# Patient Record
Sex: FEMALE | Race: WHITE | Employment: OTHER | URBAN - METROPOLITAN AREA
[De-identification: names, ages, dates, MRNs, and addresses within clinical notes are randomized per-mention and may not be internally consistent; named-entity substitution may affect disease eponyms.]

---

## 2017-01-09 ENCOUNTER — APPOINTMENT (OUTPATIENT)
Dept: GENERAL RADIOLOGY | Facility: HOSPITAL | Age: 82
DRG: 190 | End: 2017-01-09
Attending: EMERGENCY MEDICINE
Payer: MEDICARE

## 2017-01-09 ENCOUNTER — HOSPITAL ENCOUNTER (INPATIENT)
Facility: HOSPITAL | Age: 82
LOS: 3 days | Discharge: HOME | DRG: 190 | End: 2017-01-13
Attending: EMERGENCY MEDICINE | Admitting: HOSPITALIST
Payer: MEDICARE

## 2017-01-09 DIAGNOSIS — J18.9 COMMUNITY ACQUIRED PNEUMONIA: Primary | ICD-10-CM

## 2017-01-09 PROCEDURE — 71020 XR CHEST PA + LAT CHEST (CPT=71020): CPT

## 2017-01-09 PROCEDURE — 93005 ELECTROCARDIOGRAM TRACING: CPT

## 2017-01-09 PROCEDURE — 99285 EMERGENCY DEPT VISIT HI MDM: CPT

## 2017-01-09 PROCEDURE — 96374 THER/PROPH/DIAG INJ IV PUSH: CPT

## 2017-01-09 PROCEDURE — 94640 AIRWAY INHALATION TREATMENT: CPT

## 2017-01-09 PROCEDURE — 36415 COLL VENOUS BLD VENIPUNCTURE: CPT

## 2017-01-09 PROCEDURE — 93010 ELECTROCARDIOGRAM REPORT: CPT | Performed by: EMERGENCY MEDICINE

## 2017-01-09 RX ORDER — IPRATROPIUM BROMIDE AND ALBUTEROL SULFATE 2.5; .5 MG/3ML; MG/3ML
3 SOLUTION RESPIRATORY (INHALATION) ONCE
Status: COMPLETED | OUTPATIENT
Start: 2017-01-09 | End: 2017-01-09

## 2017-01-09 RX ORDER — IPRATROPIUM BROMIDE AND ALBUTEROL SULFATE 2.5; .5 MG/3ML; MG/3ML
3 SOLUTION RESPIRATORY (INHALATION) EVERY 6 HOURS PRN
Status: DISCONTINUED | OUTPATIENT
Start: 2017-01-09 | End: 2017-01-11

## 2017-01-10 LAB
ADENOVIRUS PCR:: NEGATIVE
ANION GAP SERPL CALC-SCNC: 12 MMOL/L (ref 0–18)
ANION GAP SERPL CALC-SCNC: 7 MMOL/L (ref 0–18)
B PERT DNA SPEC QL NAA+PROBE: NEGATIVE
BASOPHILS # BLD: 0 K/UL (ref 0–0.2)
BASOPHILS # BLD: 0 K/UL (ref 0–0.2)
BASOPHILS NFR BLD: 0 %
BASOPHILS NFR BLD: 0 %
BUN SERPL-MCNC: 17 MG/DL (ref 8–20)
BUN SERPL-MCNC: 19 MG/DL (ref 8–20)
BUN/CREAT SERPL: 11.2 (ref 10–20)
BUN/CREAT SERPL: 11.7 (ref 10–20)
C PNEUM DNA SPEC QL NAA+PROBE: NEGATIVE
CALCIUM SERPL-MCNC: 8.4 MG/DL (ref 8.5–10.5)
CALCIUM SERPL-MCNC: 8.6 MG/DL (ref 8.5–10.5)
CHLORIDE SERPL-SCNC: 101 MMOL/L (ref 95–110)
CHLORIDE SERPL-SCNC: 105 MMOL/L (ref 95–110)
CO2 SERPL-SCNC: 25 MMOL/L (ref 22–32)
CO2 SERPL-SCNC: 26 MMOL/L (ref 22–32)
CORONAVIRUS 229E PCR:: NEGATIVE
CORONAVIRUS HKU1 PCR:: NEGATIVE
CORONAVIRUS NL63 PCR:: NEGATIVE
CORONAVIRUS OC43 PCR:: NEGATIVE
CREAT SERPL-MCNC: 1.45 MG/DL (ref 0.5–1.5)
CREAT SERPL-MCNC: 1.7 MG/DL (ref 0.5–1.5)
EOSINOPHIL # BLD: 0.1 K/UL (ref 0–0.7)
EOSINOPHIL # BLD: 0.2 K/UL (ref 0–0.7)
EOSINOPHIL NFR BLD: 1 %
EOSINOPHIL NFR BLD: 1 %
ERYTHROCYTE [DISTWIDTH] IN BLOOD BY AUTOMATED COUNT: 15 % (ref 11–15)
ERYTHROCYTE [DISTWIDTH] IN BLOOD BY AUTOMATED COUNT: 15 % (ref 11–15)
FLUAV H1 2009 PAND RNA SPEC QL NAA+PROBE: NEGATIVE
FLUAV H1 RNA SPEC QL NAA+PROBE: NEGATIVE
FLUAV H3 RNA SPEC QL NAA+PROBE: NEGATIVE
FLUAV RNA SPEC QL NAA+PROBE: NEGATIVE
FLUBV RNA SPEC QL NAA+PROBE: NEGATIVE
GLUCOSE BLDC GLUCOMTR-MCNC: 175 MG/DL (ref 70–99)
GLUCOSE BLDC GLUCOMTR-MCNC: 181 MG/DL (ref 70–99)
GLUCOSE BLDC GLUCOMTR-MCNC: 182 MG/DL (ref 70–99)
GLUCOSE BLDC GLUCOMTR-MCNC: 272 MG/DL (ref 70–99)
GLUCOSE BLDC GLUCOMTR-MCNC: 401 MG/DL (ref 70–99)
GLUCOSE SERPL-MCNC: 180 MG/DL (ref 70–99)
GLUCOSE SERPL-MCNC: 183 MG/DL (ref 70–99)
HBA1C MFR BLD: 8.2 % (ref 4–6)
HCT VFR BLD AUTO: 25.6 % (ref 35–48)
HCT VFR BLD AUTO: 28.5 % (ref 35–48)
HGB BLD-MCNC: 8.2 G/DL (ref 12–16)
HGB BLD-MCNC: 9.2 G/DL (ref 12–16)
LYMPHOCYTES # BLD: 10.2 K/UL (ref 1–4)
LYMPHOCYTES # BLD: 9.1 K/UL (ref 1–4)
LYMPHOCYTES NFR BLD: 60 %
LYMPHOCYTES NFR BLD: 63 %
MCH RBC QN AUTO: 27.9 PG (ref 27–32)
MCH RBC QN AUTO: 28 PG (ref 27–32)
MCHC RBC AUTO-ENTMCNC: 32 G/DL (ref 32–37)
MCHC RBC AUTO-ENTMCNC: 32.4 G/DL (ref 32–37)
MCV RBC AUTO: 86.6 FL (ref 80–100)
MCV RBC AUTO: 87.2 FL (ref 80–100)
METAPNEUMOVIRUS PCR:: NEGATIVE
MONOCYTES # BLD: 0.6 K/UL (ref 0–1)
MONOCYTES # BLD: 0.7 K/UL (ref 0–1)
MONOCYTES NFR BLD: 4 %
MONOCYTES NFR BLD: 4 %
MYCOPLASMA PNEUMONIA PCR:: NEGATIVE
NEUTROPHILS # BLD AUTO: 5.3 K/UL (ref 1.8–7.7)
NEUTROPHILS # BLD AUTO: 5.4 K/UL (ref 1.8–7.7)
NEUTROPHILS NFR BLD: 32 %
NEUTROPHILS NFR BLD: 35 %
OSMOLALITY UR CALC.SUM OF ELEC: 292 MOSM/KG (ref 275–295)
OSMOLALITY UR CALC.SUM OF ELEC: 293 MOSM/KG (ref 275–295)
PARAINFLUENZA 1 PCR:: NEGATIVE
PARAINFLUENZA 2 PCR:: NEGATIVE
PARAINFLUENZA 3 PCR:: NEGATIVE
PARAINFLUENZA 4 PCR:: NEGATIVE
PLATELET # BLD AUTO: 147 K/UL (ref 140–400)
PLATELET # BLD AUTO: 159 K/UL (ref 140–400)
PMV BLD AUTO: 8.5 FL (ref 7.4–10.3)
PMV BLD AUTO: 9.2 FL (ref 7.4–10.3)
POTASSIUM SERPL-SCNC: 3.9 MMOL/L (ref 3.3–5.1)
POTASSIUM SERPL-SCNC: 4.4 MMOL/L (ref 3.3–5.1)
PROCALCITONIN SERPL-MCNC: 0.13 NG/ML (ref ?–0.11)
RBC # BLD AUTO: 2.94 M/UL (ref 3.7–5.4)
RBC # BLD AUTO: 3.29 M/UL (ref 3.7–5.4)
RHINOVIRUS/ENTERO PCR:: POSITIVE
RSV RNA SPEC QL NAA+PROBE: NEGATIVE
SODIUM SERPL-SCNC: 138 MMOL/L (ref 136–144)
SODIUM SERPL-SCNC: 138 MMOL/L (ref 136–144)
TROPONIN I SERPL-MCNC: 0.01 NG/ML (ref ?–0.03)
WBC # BLD AUTO: 15.3 K/UL (ref 4–11)
WBC # BLD AUTO: 16.4 K/UL (ref 4–11)

## 2017-01-10 PROCEDURE — 83036 HEMOGLOBIN GLYCOSYLATED A1C: CPT | Performed by: NURSE PRACTITIONER

## 2017-01-10 PROCEDURE — 85025 COMPLETE CBC W/AUTO DIFF WBC: CPT | Performed by: EMERGENCY MEDICINE

## 2017-01-10 PROCEDURE — 84145 PROCALCITONIN (PCT): CPT | Performed by: NURSE PRACTITIONER

## 2017-01-10 PROCEDURE — 87205 SMEAR GRAM STAIN: CPT | Performed by: INTERNAL MEDICINE

## 2017-01-10 PROCEDURE — 87040 BLOOD CULTURE FOR BACTERIA: CPT | Performed by: EMERGENCY MEDICINE

## 2017-01-10 PROCEDURE — 84484 ASSAY OF TROPONIN QUANT: CPT | Performed by: EMERGENCY MEDICINE

## 2017-01-10 PROCEDURE — 80048 BASIC METABOLIC PNL TOTAL CA: CPT | Performed by: EMERGENCY MEDICINE

## 2017-01-10 PROCEDURE — 87186 SC STD MICRODIL/AGAR DIL: CPT

## 2017-01-10 PROCEDURE — 87486 CHLMYD PNEUM DNA AMP PROBE: CPT | Performed by: HOSPITALIST

## 2017-01-10 PROCEDURE — 87633 RESP VIRUS 12-25 TARGETS: CPT | Performed by: HOSPITALIST

## 2017-01-10 PROCEDURE — 85025 COMPLETE CBC W/AUTO DIFF WBC: CPT | Performed by: INTERNAL MEDICINE

## 2017-01-10 PROCEDURE — 87158 CULTURE TYPING ADDED METHOD: CPT

## 2017-01-10 PROCEDURE — 87581 M.PNEUMON DNA AMP PROBE: CPT | Performed by: HOSPITALIST

## 2017-01-10 PROCEDURE — 82962 GLUCOSE BLOOD TEST: CPT

## 2017-01-10 PROCEDURE — 87206 SMEAR FLUORESCENT/ACID STAI: CPT | Performed by: INTERNAL MEDICINE

## 2017-01-10 PROCEDURE — 94640 AIRWAY INHALATION TREATMENT: CPT

## 2017-01-10 PROCEDURE — 87070 CULTURE OTHR SPECIMN AEROBIC: CPT | Performed by: INTERNAL MEDICINE

## 2017-01-10 PROCEDURE — 87798 DETECT AGENT NOS DNA AMP: CPT | Performed by: HOSPITALIST

## 2017-01-10 PROCEDURE — 80048 BASIC METABOLIC PNL TOTAL CA: CPT | Performed by: INTERNAL MEDICINE

## 2017-01-10 PROCEDURE — 87149 DNA/RNA DIRECT PROBE: CPT

## 2017-01-10 PROCEDURE — 87116 MYCOBACTERIA CULTURE: CPT | Performed by: INTERNAL MEDICINE

## 2017-01-10 RX ORDER — FLUTICASONE PROPIONATE 50 MCG
1 SPRAY, SUSPENSION (ML) NASAL 2 TIMES DAILY
Status: DISCONTINUED | OUTPATIENT
Start: 2017-01-10 | End: 2017-01-13

## 2017-01-10 RX ORDER — GUAIFENESIN 600 MG
600 TABLET, EXTENDED RELEASE 12 HR ORAL 2 TIMES DAILY
Status: DISCONTINUED | OUTPATIENT
Start: 2017-01-10 | End: 2017-01-13

## 2017-01-10 RX ORDER — ENOXAPARIN SODIUM 100 MG/ML
40 INJECTION SUBCUTANEOUS DAILY
Status: DISCONTINUED | OUTPATIENT
Start: 2017-01-10 | End: 2017-01-13

## 2017-01-10 RX ORDER — PANTOPRAZOLE SODIUM 40 MG/1
40 TABLET, DELAYED RELEASE ORAL DAILY
Status: DISCONTINUED | OUTPATIENT
Start: 2017-01-10 | End: 2017-01-13

## 2017-01-10 RX ORDER — DEXTROSE MONOHYDRATE 25 G/50ML
50 INJECTION, SOLUTION INTRAVENOUS AS NEEDED
Status: DISCONTINUED | OUTPATIENT
Start: 2017-01-10 | End: 2017-01-13

## 2017-01-10 RX ORDER — GUAIFENESIN 100 MG/5ML
200 SOLUTION ORAL EVERY 4 HOURS PRN
Status: DISCONTINUED | OUTPATIENT
Start: 2017-01-10 | End: 2017-01-13

## 2017-01-10 RX ORDER — AMLODIPINE BESYLATE 5 MG/1
5 TABLET ORAL DAILY
Status: DISCONTINUED | OUTPATIENT
Start: 2017-01-10 | End: 2017-01-13

## 2017-01-10 RX ORDER — PREDNISONE 20 MG/1
40 TABLET ORAL
Status: DISCONTINUED | OUTPATIENT
Start: 2017-01-10 | End: 2017-01-13

## 2017-01-10 RX ORDER — LEVOTHYROXINE SODIUM 0.05 MG/1
50 TABLET ORAL
Status: DISCONTINUED | OUTPATIENT
Start: 2017-01-10 | End: 2017-01-13

## 2017-01-10 RX ORDER — ESCITALOPRAM OXALATE 10 MG/1
20 TABLET ORAL
Status: DISCONTINUED | OUTPATIENT
Start: 2017-01-10 | End: 2017-01-13

## 2017-01-10 RX ORDER — ONDANSETRON 2 MG/ML
4 INJECTION INTRAMUSCULAR; INTRAVENOUS EVERY 6 HOURS PRN
Status: DISCONTINUED | OUTPATIENT
Start: 2017-01-10 | End: 2017-01-13

## 2017-01-10 RX ORDER — LOSARTAN POTASSIUM 100 MG/1
100 TABLET ORAL DAILY
Status: DISCONTINUED | OUTPATIENT
Start: 2017-01-10 | End: 2017-01-13

## 2017-01-10 RX ORDER — ACETAMINOPHEN 325 MG/1
650 TABLET ORAL EVERY 6 HOURS PRN
Status: DISCONTINUED | OUTPATIENT
Start: 2017-01-10 | End: 2017-01-13

## 2017-01-10 RX ORDER — ATORVASTATIN CALCIUM 10 MG/1
20 TABLET, FILM COATED ORAL NIGHTLY
Status: DISCONTINUED | OUTPATIENT
Start: 2017-01-11 | End: 2017-01-13

## 2017-01-10 RX ORDER — ALBUTEROL SULFATE 90 UG/1
2 AEROSOL, METERED RESPIRATORY (INHALATION) EVERY 4 HOURS PRN
Status: DISCONTINUED | OUTPATIENT
Start: 2017-01-10 | End: 2017-01-10

## 2017-01-10 RX ORDER — ALLOPURINOL 100 MG/1
100 TABLET ORAL DAILY
Status: DISCONTINUED | OUTPATIENT
Start: 2017-01-10 | End: 2017-01-13

## 2017-01-10 NOTE — PROGRESS NOTES
simvastatin (ZOCOR) tab 20 mg  is Non-Formulary Medication &  Auto-Substituted to Atorvastatin 10mg Per P&T PROTOCOL

## 2017-01-10 NOTE — H&P
[facility] Hospitalist Team  History and Physical     ASSESSMENT / PLAN:   81 yo female with CLL, Lung nodules, DM type II, HTN, depression, hypothyroidism, HL, GERD, renal caculi-on allopurinol,  morbid obesity- BMI 44, CKD stage III who presents with acute on c -BMI 44  -wt loss endeavors as outpt   -SUMIT eval     FEN  -lytes in am  -diet-ADA    Prophy  -SCD  -lovenox    Dispo  -pending clinical coarse  Spoke to daughter and  at [facility]. Pt is Ma and will need MA PANCHO appt.  Will discuss with pt   PCP: Joselyn Hammans PERRLA, EOMI, sclera anicteric, conjunctiva normal  NECK: supple; no JVD; no carotid bruits   RESPIRATORY: normal expansion; non labored; CTA   CARDIOVASCULAR: regular,nl S1 S2; no murmur, no gallop; no rub   ABDOMEN:  Soft, BS+; nondistended; non tender;n Disp: 90 tablet Rfl: 3   escitalopram 20 MG Oral Tab Take 1 tablet (20 mg total) by mouth once daily. Disp: 90 tablet Rfl: 3   Fluticasone Furoate-Vilanterol 200-25 MCG/INH Inhalation Aerosol Powder, Breath Activated Inhale 1 puff into the lungs daily.  Dis ALB, AMYLASE, LIPASE, LDH in the last 72 hours. Invalid input(s): ALPHOS, TBIL, DBIL, TPROT    Recent Labs   Lab  01/10/17   0008   TROP  0.01         Radiology: Xr Chest Pa + Lat Chest (cpt=71020)    1/10/2017  CONCLUSION:  1. Left basilar pneumonia.  2 PFT's once recovered  -wean O2  -pulmonary consult    Acute on Chronic SOB 2/2 PNA/Rhinovirus R/O Cardiac cause    -see above  -Troponin negative x1, EKG with ST, old anterior infarct and nonspecific ST-T wave changes  -echo ordered  -outpt ischemia eval a

## 2017-01-10 NOTE — CDS QUERY
Potential Risk for Infection  CLINICAL Lori Moeller    Dear Doctor:  Clinical information (provided below) suggests Potential Risk for Infection.  For accurate ICD-10-CM code assignment to reflect severity of illness and risk of mortalit

## 2017-01-10 NOTE — PROGRESS NOTES
Critical access hospital Pharmacy Note: Antimicrobial Weight Dose Adjustment for: Rocephin (ceftriaxone)    Cathy Lambert is a 80year old female who has been prescribed Rocephin (ceftriaxone) 1gm ivpb q24h.   CrCl is estimated creatinine clearance is 24.3 mL/min (based on Cr

## 2017-01-10 NOTE — CONSULTS
Pulmonary H&P/Consult       NAME: [address]: 515/890-O - MRN: [id number] - Age: 80year old - :  1935    Date of Admission: 2017 10:15 PM  Admission Diagnosis: Community acquired pneumonia [J18.9]    Assessment/Plan:  1.  Acute hypox and unspecified hyperlipidemia    • Unspecified essential hypertension    • Type II or unspecified type diabetes mellitus without mention of complication, not stated as uncontrolled ([facility]    • Hypothyroid    • Renal lithiasis    • Depression    • Esophagea blurred vision or double vision   HEENT: denies nasal congestion, sinus pain or ST , hard of hearing   RESPIRATORY: per HPI  CARDIOVASCULAR: denies current chest pain   GI: denies abdominal pain, diarrhea, constipation  : denies dysuria or changes in str

## 2017-01-10 NOTE — ED PROVIDER NOTES
Patient Seen in: [facility] AND [facility] Emergency Department    History   Patient presents with:  Dyspnea ALEC SOB (respiratory)    Stated Complaint: Shortness of breath, Wheezing    HPI    Patient complains of shortness of breath that began over a month ago w po before breakfast   Cefuroxime Axetil 250 MG Oral Tab,  Take 1 tablet (250 mg total) by mouth 2 (two) times daily.    methylPREDNISolone 4 MG Oral Tablet Therapy Pack,  Use as directed for 6 days   Albuterol Sulfate  (90 BASE) MCG/ACT Inhalation Ae • Breast Cancer Paternal Grandmother      age 79         Smoking Status: Former Smoker                   Packs/Day: 2.00  Years: 45        Quit date: 01/07/1987    Smokeless Status: Never Used                        Alcohol Use: No                Review strain, nl intervals            MDM     Monitor Interpretation:  s tach 104    Radiology Interpretation:  Xr Chest Pa + Lat Chest (cpt=71020)    1/10/2017  CONCLUSION:  1. Left basilar pneumonia. 2. Atherosclerosis. 3. Hyperinflation.  4. Scarring/atelectas

## 2017-01-11 ENCOUNTER — APPOINTMENT (OUTPATIENT)
Dept: CV DIAGNOSTICS | Facility: HOSPITAL | Age: 82
DRG: 190 | End: 2017-01-11
Attending: NURSE PRACTITIONER
Payer: MEDICARE

## 2017-01-11 LAB
ANION GAP SERPL CALC-SCNC: 9 MMOL/L (ref 0–18)
BASOPHILS # BLD: 0 K/UL (ref 0–0.2)
BASOPHILS NFR BLD: 0 %
BUN SERPL-MCNC: 24 MG/DL (ref 8–20)
BUN/CREAT SERPL: 15.6 (ref 10–20)
CALCIUM SERPL-MCNC: 8.8 MG/DL (ref 8.5–10.5)
CHLORIDE SERPL-SCNC: 105 MMOL/L (ref 95–110)
CO2 SERPL-SCNC: 23 MMOL/L (ref 22–32)
CREAT SERPL-MCNC: 1.54 MG/DL (ref 0.5–1.5)
EOSINOPHIL # BLD: 0 K/UL (ref 0–0.7)
EOSINOPHIL NFR BLD: 0 %
ERYTHROCYTE [DISTWIDTH] IN BLOOD BY AUTOMATED COUNT: 15 % (ref 11–15)
FERRITIN SERPL IA-MCNC: 239 NG/ML (ref 11–307)
GLUCOSE BLDC GLUCOMTR-MCNC: 247 MG/DL (ref 70–99)
GLUCOSE BLDC GLUCOMTR-MCNC: 250 MG/DL (ref 70–99)
GLUCOSE BLDC GLUCOMTR-MCNC: 329 MG/DL (ref 70–99)
GLUCOSE BLDC GLUCOMTR-MCNC: 357 MG/DL (ref 70–99)
GLUCOSE BLDC GLUCOMTR-MCNC: 384 MG/DL (ref 70–99)
GLUCOSE SERPL-MCNC: 267 MG/DL (ref 70–99)
HCT VFR BLD AUTO: 28.5 % (ref 35–48)
HGB BLD-MCNC: 9.1 G/DL (ref 12–16)
IRON SATN MFR SERPL: 23 % (ref 15–50)
IRON SERPL-MCNC: 57 MCG/DL (ref 28–170)
LYMPHOCYTES # BLD: 14.8 K/UL (ref 1–4)
LYMPHOCYTES NFR BLD: 70 %
MCH RBC QN AUTO: 27.9 PG (ref 27–32)
MCHC RBC AUTO-ENTMCNC: 31.8 G/DL (ref 32–37)
MCV RBC AUTO: 87.6 FL (ref 80–100)
MONOCYTES # BLD: 0.3 K/UL (ref 0–1)
MONOCYTES NFR BLD: 2 %
NEUTROPHILS # BLD AUTO: 6 K/UL (ref 1.8–7.7)
NEUTROPHILS NFR BLD: 28 %
OSMOLALITY UR CALC.SUM OF ELEC: 297 MOSM/KG (ref 275–295)
PLATELET # BLD AUTO: 185 K/UL (ref 140–400)
PMV BLD AUTO: 9.2 FL (ref 7.4–10.3)
POTASSIUM SERPL-SCNC: 4.6 MMOL/L (ref 3.3–5.1)
RBC # BLD AUTO: 3.25 M/UL (ref 3.7–5.4)
SODIUM SERPL-SCNC: 137 MMOL/L (ref 136–144)
TIBC SERPL-MCNC: 243 MCG/DL (ref 228–428)
TRANSFERRIN SERPL-MCNC: 184 MG/DL (ref 192–382)
TRANSFERRIN SERPL-MCNC: 184 MG/DL (ref 192–382)
WBC # BLD AUTO: 21.1 K/UL (ref 4–11)

## 2017-01-11 PROCEDURE — 80048 BASIC METABOLIC PNL TOTAL CA: CPT | Performed by: NURSE PRACTITIONER

## 2017-01-11 PROCEDURE — 84466 ASSAY OF TRANSFERRIN: CPT | Performed by: HOSPITALIST

## 2017-01-11 PROCEDURE — 93306 TTE W/DOPPLER COMPLETE: CPT | Performed by: INTERNAL MEDICINE

## 2017-01-11 PROCEDURE — 94640 AIRWAY INHALATION TREATMENT: CPT

## 2017-01-11 PROCEDURE — 83540 ASSAY OF IRON: CPT | Performed by: HOSPITALIST

## 2017-01-11 PROCEDURE — 93306 TTE W/DOPPLER COMPLETE: CPT

## 2017-01-11 PROCEDURE — 85025 COMPLETE CBC W/AUTO DIFF WBC: CPT | Performed by: NURSE PRACTITIONER

## 2017-01-11 PROCEDURE — 82962 GLUCOSE BLOOD TEST: CPT

## 2017-01-11 PROCEDURE — 82728 ASSAY OF FERRITIN: CPT | Performed by: HOSPITALIST

## 2017-01-11 RX ORDER — IPRATROPIUM BROMIDE AND ALBUTEROL SULFATE 2.5; .5 MG/3ML; MG/3ML
3 SOLUTION RESPIRATORY (INHALATION)
Status: DISCONTINUED | OUTPATIENT
Start: 2017-01-11 | End: 2017-01-13

## 2017-01-11 NOTE — DISCHARGE PLANNING
MDO received for home health services. [facility]. Referral made to [facility].  Face to Face encounter and clarified hhc orders are needed prior to dc.    INNA nayak [id number]

## 2017-01-11 NOTE — DIETARY NOTE
Patient educated regarding diabetes diet basics. Discussed carbohydrate foods, portion sizes, and food label reading. Handout given and initial meal plan provided. Encouraged to attend outpatient diabetes education. Questions answered.  Receptive to inform

## 2017-01-11 NOTE — PROGRESS NOTES
[facility] Hospitalist Team  Progress Note    Karlie Stevenson Patient Status:  Inpatient    1935 MRN [id number]   Location [facility] 5SW/SE Attending Gregorio Juarez MD   Hosp Day # 2 PCP Onur Christie MD     CC: Follow Up  PCP: Onur Christie MD diminished in size since 1/21/2014.  -plans for follow up CT as outpt as per pulmonary-repeat imaging 8 weeks after resolution of current acute presentation    Renal Calculi  -continue preventive meds    HTN  -continue home meds    Depression   -continue h 0740   NA  138  138  137   K  3.9  4.4  4.6   CL  101  105  105   CO2  25  26  23   BUN  19  17  24*   CREATSERUM  1.70*  1.45  1.54*   CA  8.6  8.4*  8.8   GLU  180*  183*  267*       No results for input(s): ALT, AST, ALB, AMYLASE, LIPASE, LDH in the las MCG/INH inhaler 1 puff 1 puff Inhalation Daily   Fluticasone Propionate (FLONASE) 50 MCG/ACT nasal spray 1 spray 1 spray Each Nare BID   predniSONE (DELTASONE) tab 40 mg 40 mg Oral Daily with breakfast   ipratropium-albuterol (DUONEB) nebulizer solution 3 above  -Troponin negative x1, EKG with ST, 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

## 2017-01-11 NOTE — PROGRESS NOTES
Pulmonary Progress Note     Assessment / Plan:  1.  Acute hypoxic respiratory failure - likely from viral pneumonia in the setting of probable COPD  - wean O2 as able, home O2 eval prior to d/c  - cont prednisone po and abx as below  - duonebs prn  - prn an

## 2017-01-11 NOTE — PLAN OF CARE
Diabetes/Glucose Control    • Glucose maintained within prescribed range Progressing        Patient/Family Goals    • Patient/Family Long Term Goal Progressing    • Patient/Family Short Term Goal Progressing        SAFETY ADULT - FALL    • Free from fall i

## 2017-01-12 LAB
ANION GAP SERPL CALC-SCNC: 8 MMOL/L (ref 0–18)
BASOPHILS # BLD: 0 K/UL (ref 0–0.2)
BASOPHILS NFR BLD: 0 %
BUN SERPL-MCNC: 33 MG/DL (ref 8–20)
BUN/CREAT SERPL: 23.9 (ref 10–20)
CALCIUM SERPL-MCNC: 8.8 MG/DL (ref 8.5–10.5)
CHLORIDE SERPL-SCNC: 107 MMOL/L (ref 95–110)
CO2 SERPL-SCNC: 24 MMOL/L (ref 22–32)
CREAT SERPL-MCNC: 1.38 MG/DL (ref 0.5–1.5)
EOSINOPHIL # BLD: 0 K/UL (ref 0–0.7)
EOSINOPHIL NFR BLD: 0 %
ERYTHROCYTE [DISTWIDTH] IN BLOOD BY AUTOMATED COUNT: 15.1 % (ref 11–15)
GLUCOSE BLDC GLUCOMTR-MCNC: 148 MG/DL (ref 70–99)
GLUCOSE BLDC GLUCOMTR-MCNC: 175 MG/DL (ref 70–99)
GLUCOSE BLDC GLUCOMTR-MCNC: 324 MG/DL (ref 70–99)
GLUCOSE BLDC GLUCOMTR-MCNC: 378 MG/DL (ref 70–99)
GLUCOSE SERPL-MCNC: 156 MG/DL (ref 70–99)
HCT VFR BLD AUTO: 25.6 % (ref 35–48)
HGB BLD-MCNC: 8.3 G/DL (ref 12–16)
LYMPHOCYTES # BLD: 13.5 K/UL (ref 1–4)
LYMPHOCYTES NFR BLD: 65 %
MCH RBC QN AUTO: 28.5 PG (ref 27–32)
MCHC RBC AUTO-ENTMCNC: 32.6 G/DL (ref 32–37)
MCV RBC AUTO: 87.3 FL (ref 80–100)
MONOCYTES # BLD: 0.5 K/UL (ref 0–1)
MONOCYTES NFR BLD: 3 %
NEUTROPHILS # BLD AUTO: 6.7 K/UL (ref 1.8–7.7)
NEUTROPHILS NFR BLD: 32 %
OSMOLALITY UR CALC.SUM OF ELEC: 298 MOSM/KG (ref 275–295)
PLATELET # BLD AUTO: 203 K/UL (ref 140–400)
PMV BLD AUTO: 9.2 FL (ref 7.4–10.3)
POTASSIUM SERPL-SCNC: 4.6 MMOL/L (ref 3.3–5.1)
RBC # BLD AUTO: 2.93 M/UL (ref 3.7–5.4)
SODIUM SERPL-SCNC: 139 MMOL/L (ref 136–144)
WBC # BLD AUTO: 20.8 K/UL (ref 4–11)

## 2017-01-12 PROCEDURE — 97161 PT EVAL LOW COMPLEX 20 MIN: CPT

## 2017-01-12 PROCEDURE — 97165 OT EVAL LOW COMPLEX 30 MIN: CPT

## 2017-01-12 PROCEDURE — 80048 BASIC METABOLIC PNL TOTAL CA: CPT | Performed by: NURSE PRACTITIONER

## 2017-01-12 PROCEDURE — 85025 COMPLETE CBC W/AUTO DIFF WBC: CPT | Performed by: NURSE PRACTITIONER

## 2017-01-12 PROCEDURE — 82962 GLUCOSE BLOOD TEST: CPT

## 2017-01-12 PROCEDURE — 94667 MNPJ CHEST WALL 1ST: CPT

## 2017-01-12 PROCEDURE — 97116 GAIT TRAINING THERAPY: CPT

## 2017-01-12 PROCEDURE — 94640 AIRWAY INHALATION TREATMENT: CPT

## 2017-01-12 RX ORDER — CEFDINIR 300 MG/1
300 CAPSULE ORAL 2 TIMES DAILY
Qty: 8 CAPSULE | Refills: 0 | Status: SHIPPED | OUTPATIENT
Start: 2017-01-12 | End: 2017-01-16

## 2017-01-12 RX ORDER — GUAIFENESIN 600 MG
600 TABLET, EXTENDED RELEASE 12 HR ORAL 2 TIMES DAILY
Qty: 60 TABLET | Refills: 0 | Status: SHIPPED | OUTPATIENT
Start: 2017-01-12 | End: 2017-02-01 | Stop reason: ALTCHOICE

## 2017-01-12 RX ORDER — TRAZODONE HYDROCHLORIDE 50 MG/1
50 TABLET ORAL NIGHTLY PRN
Status: DISCONTINUED | OUTPATIENT
Start: 2017-01-12 | End: 2017-01-12

## 2017-01-12 RX ORDER — CARVEDILOL 3.12 MG/1
3.12 TABLET ORAL 2 TIMES DAILY WITH MEALS
Status: DISCONTINUED | OUTPATIENT
Start: 2017-01-12 | End: 2017-01-13

## 2017-01-12 RX ORDER — PREDNISONE 20 MG/1
40 TABLET ORAL
Qty: 6 TABLET | Refills: 0 | Status: SHIPPED | OUTPATIENT
Start: 2017-01-12 | End: 2017-01-15

## 2017-01-12 RX ORDER — AZITHROMYCIN 250 MG/1
250 TABLET, FILM COATED ORAL DAILY
Qty: 3 TABLET | Refills: 0 | Status: SHIPPED | OUTPATIENT
Start: 2017-01-12 | End: 2017-01-15

## 2017-01-12 RX ORDER — BENZONATATE 100 MG/1
100 CAPSULE ORAL 3 TIMES DAILY PRN
Status: DISCONTINUED | OUTPATIENT
Start: 2017-01-12 | End: 2017-01-13

## 2017-01-12 RX ORDER — FLUTICASONE PROPIONATE 50 MCG
1 SPRAY, SUSPENSION (ML) NASAL 2 TIMES DAILY
Qty: 1 BOTTLE | Refills: 3 | Status: SHIPPED | OUTPATIENT
Start: 2017-01-12 | End: 2017-02-01 | Stop reason: ALTCHOICE

## 2017-01-12 RX ORDER — CARVEDILOL 3.12 MG/1
3.12 TABLET ORAL 2 TIMES DAILY WITH MEALS
Qty: 60 TABLET | Refills: 0 | Status: SHIPPED | OUTPATIENT
Start: 2017-01-12 | End: 2017-02-22

## 2017-01-12 NOTE — PHYSICAL THERAPY NOTE
PHYSICAL THERAPY QUICK EVALUATION - INPATIENT    Room Number: 532/532-A  Evaluation Date: 1/12/2017  Presenting Problem: Community acquired pna and rhinovirus  Physician Order: PT Eval and Treat    Problem List  Principal Problem:    Community acquired pne usually is with activity. OBJECTIVE  Precautions:  Other (Comment) (Contact and droplet )  Fall Risk: Standard fall risk    WEIGHT BEARING RESTRICTION  Weight Bearing Restriction: None                PAIN ASSESSMENT  Ratin         RANGE OF MOTION AN transfers, and ambulation with Nancy. Patient able to ambulate with and without rolling walker for ambulation within room. Patient with no shortness of breath throughout session and demonstrates safe mobility without loss of balance.  Patient was left in res

## 2017-01-12 NOTE — OCCUPATIONAL THERAPY NOTE
OCCUPATIONAL THERAPY QUICK EVALUATION - INPATIENT    Room Number: 532/532-A  Evaluation Date: 1/12/2017     Type of Evaluation: Initial  Presenting Problem: rhinovirus/pneumonia    Physician Order: IP Consult to Occupational Therapy  Reason for Therapy:  A modified surroundings to meet her needs. Pt's  assists as needed. Pt uses and electric scooter or rw for community distances.  Pt has DME items in place    SUBJECTIVE  Pt c/o fatigue due to inability to sleep because of her cough    OBJECTIVE  Precau discussed. Pt indicated that she had begun to make modifications to her daily routine already. Pt presenting w/ good insight and safety awareness. Pt's primary limitation at this time is decreased activity tolerance and endurance due to current condition.

## 2017-01-12 NOTE — PROGRESS NOTES
[address] Hospitalist Team  Progress Note    Cyrus Hernandez Patient Status:  Inpatient    1935 MRN [id number]   Location [facility] 5SW/SE Attending Elmyra Nageotte, MD   Hosp Day # 3 PCP Mary Jo Caro MD     CC: Follow Up  PCP: Mary Jo Caro MD follow up CT as outpt as per pulmonary-repeat imaging 8 weeks after resolution of current acute presentation    Renal Calculi  -continue preventive meds    HTN  -continue losartan, resume norvasc at d/c.  Will add back low dose coreg 3.125 mg for HR control 185  203       Recent Labs   Lab  01/10/17   0009  01/10/17   0615  01/11/17   0740  01/12/17   0549   NA  138  138  137  139   K  3.9  4.4  4.6  4.6   CL  101  105  105  107   CO2  25  26  23  24   BUN  19  17  24*  33*   CREATSERUM  1.70*  1.45  1.54*  1 Calcium (LIPITOR) tab 20 mg 20 mg Oral Nightly   guaiFENesin (ROBITUSSIN) 100 MG/5ML solution 200 mg 200 mg Oral Q4H PRN   dextrose injection 50 mL 50 mL Intravenous PRN   GuaiFENesin ER (MUCINEX) 12 hr tab 600 mg 600 mg Oral BID   umeclidinium-vilanterol pulmomonary  -prn nebulizers/inhalers as per pulmonary    -prednisone 40 mg daily, D3   -outpt PFT's once recovered  -Home O2 study- 75% on RA with ambulation with -112 with increased O2 sats to 92% with 3L  -appreciate pulmonary input     DM Type II

## 2017-01-12 NOTE — PLAN OF CARE
Home o2 Eval screening patient o2 sat on room air 88% while at rest heart rate 95, when ambulating 75% on room air heart rate 110-112, and 93% on 3L while ambulating.

## 2017-01-12 NOTE — PROGRESS NOTES
Pulmonary Progress Note        NAME: Janae Koenig - ROOM: [address] [place] [address] - MRN: [id number] - Age: 80year old - : 1935      Assessment/Plan:  1.  Acute hypoxic respiratory failure - likely from viral pneumonia in the setting of probable COPD  - wean O2 Fluticasone Propionate  1 spray Each Nare BID   • predniSONE  40 mg Oral Daily with breakfast            Intake/Output Summary (Last 24 hours) at 01/12/17 1103  Last data filed at 01/11/17 2325   Gross per 24 hour   Intake    450 ml   Output      0 ml   Ne

## 2017-01-12 NOTE — PLAN OF CARE
Patient dinner blood sugar 384 Dr. Rodríguez Never notified and received orders to give 12 units novolog at this time

## 2017-01-12 NOTE — FACE TO FACE NOTE
[facility]   Face to Face Encounter Note    Phoebe Pellet Patient Status:  Inpatient    1935 MRN [id number]   Location [facility] 5SW/SE Attending Alysa Ochoa MD   Hosp Day # 3 PCP Melany Baxter MD       I, or a non-physician practi physician who will periodically review the plan of care. The findings from this face-to-face encounter have been communicated with the patient's community-based physician who will be assuming this patient's home health plan of care.     Evans Valdovinos RN, NP

## 2017-01-12 NOTE — DISCHARGE PLANNING
New order for oxygen for pt at home. Oxygen sats and referral sent to [facility]-per pts preference.  [facility] face to face and orders received    [facility] to deliver oxygen tank to patients room within the hour    12:30pm oxygen tank delivered to ro

## 2017-01-13 VITALS
BODY MASS INDEX: 44.1 KG/M2 | SYSTOLIC BLOOD PRESSURE: 137 MMHG | RESPIRATION RATE: 18 BRPM | HEART RATE: 70 BPM | DIASTOLIC BLOOD PRESSURE: 49 MMHG | WEIGHT: 274.38 LBS | HEIGHT: 66 IN | OXYGEN SATURATION: 96 % | TEMPERATURE: 98 F

## 2017-01-13 LAB
ANION GAP SERPL CALC-SCNC: 8 MMOL/L (ref 0–18)
BASOPHILS # BLD: 0 K/UL (ref 0–0.2)
BASOPHILS NFR BLD: 0 %
BUN SERPL-MCNC: 29 MG/DL (ref 8–20)
BUN/CREAT SERPL: 22.3 (ref 10–20)
CALCIUM SERPL-MCNC: 8.9 MG/DL (ref 8.5–10.5)
CHLORIDE SERPL-SCNC: 104 MMOL/L (ref 95–110)
CO2 SERPL-SCNC: 24 MMOL/L (ref 22–32)
CREAT SERPL-MCNC: 1.3 MG/DL (ref 0.5–1.5)
EOSINOPHIL # BLD: 0 K/UL (ref 0–0.7)
EOSINOPHIL NFR BLD: 0 %
ERYTHROCYTE [DISTWIDTH] IN BLOOD BY AUTOMATED COUNT: 15 % (ref 11–15)
GLUCOSE BLDC GLUCOMTR-MCNC: 167 MG/DL (ref 70–99)
GLUCOSE BLDC GLUCOMTR-MCNC: 188 MG/DL (ref 70–99)
GLUCOSE SERPL-MCNC: 219 MG/DL (ref 70–99)
HCT VFR BLD AUTO: 25.9 % (ref 35–48)
HGB BLD-MCNC: 8.3 G/DL (ref 12–16)
LYMPHOCYTES # BLD: 14.5 K/UL (ref 1–4)
LYMPHOCYTES NFR BLD: 73 %
MAGNESIUM SERPL-MCNC: 2 MG/DL (ref 1.8–2.5)
MCH RBC QN AUTO: 28 PG (ref 27–32)
MCHC RBC AUTO-ENTMCNC: 32.2 G/DL (ref 32–37)
MCV RBC AUTO: 87.2 FL (ref 80–100)
MONOCYTES # BLD: 0.5 K/UL (ref 0–1)
MONOCYTES NFR BLD: 3 %
NEUTROPHILS # BLD AUTO: 4.8 K/UL (ref 1.8–7.7)
NEUTROPHILS NFR BLD: 24 %
OSMOLALITY UR CALC.SUM OF ELEC: 295 MOSM/KG (ref 275–295)
PLATELET # BLD AUTO: 207 K/UL (ref 140–400)
PMV BLD AUTO: 8.8 FL (ref 7.4–10.3)
POTASSIUM SERPL-SCNC: 4.5 MMOL/L (ref 3.3–5.1)
RBC # BLD AUTO: 2.98 M/UL (ref 3.7–5.4)
SODIUM SERPL-SCNC: 136 MMOL/L (ref 136–144)
WBC # BLD AUTO: 19.9 K/UL (ref 4–11)

## 2017-01-13 PROCEDURE — 80048 BASIC METABOLIC PNL TOTAL CA: CPT | Performed by: NURSE PRACTITIONER

## 2017-01-13 PROCEDURE — 82962 GLUCOSE BLOOD TEST: CPT

## 2017-01-13 PROCEDURE — 94668 MNPJ CHEST WALL SBSQ: CPT

## 2017-01-13 PROCEDURE — 83735 ASSAY OF MAGNESIUM: CPT | Performed by: HOSPITALIST

## 2017-01-13 PROCEDURE — 94640 AIRWAY INHALATION TREATMENT: CPT

## 2017-01-13 PROCEDURE — 90471 IMMUNIZATION ADMIN: CPT

## 2017-01-13 PROCEDURE — 85025 COMPLETE CBC W/AUTO DIFF WBC: CPT | Performed by: NURSE PRACTITIONER

## 2017-01-13 PROCEDURE — 3E0234Z INTRODUCTION OF SERUM, TOXOID AND VACCINE INTO MUSCLE, PERCUTANEOUS APPROACH: ICD-10-PCS | Performed by: HOSPITALIST

## 2017-01-13 RX ORDER — IPRATROPIUM BROMIDE AND ALBUTEROL SULFATE 2.5; .5 MG/3ML; MG/3ML
3 SOLUTION RESPIRATORY (INHALATION)
Status: DISCONTINUED | OUTPATIENT
Start: 2017-01-13 | End: 2017-01-13

## 2017-01-13 NOTE — PLAN OF CARE
Diabetes/Glucose Control    • Glucose maintained within prescribed range Not Progressing          Patient/Family Goals    • Patient/Family Long Term Goal Progressing    • Patient/Family Short Term Goal Progressing        SAFETY ADULT - FALL    • Free from

## 2017-01-13 NOTE — PROGRESS NOTES
Pulmonary Progress Note     Assessment / Plan:  1.  Acute hypoxic respiratory failure - likely from viral pneumonia in the setting of probable COPD  - will need home O2  - cont prednisone po and abx as below  - duonebs prn  - prn antitussives, cont flonase

## 2017-01-13 NOTE — PLAN OF CARE
Diabetes/Glucose Control    • Glucose maintained within prescribed range Adequate for Discharge        Patient/Family Goals    • Patient/Family Long Term Goal Adequate for Discharge    • Patient/Family Short Term Goal Adequate for Discharge        SAFETY A

## 2017-01-13 NOTE — PLAN OF CARE
Patient discharge home with family at bedside on 3 L of O2,,IV removed, influenza vaccine given,updated pt on new prescriptions and COPD appointment.

## 2017-01-13 NOTE — DISCHARGE PLANNING
SW received call from [facility] who confirmed the pt is stable for dc to home today. Pt's home O2 is being delivered to the room from [facility]. [facility] is aware of the dc for today.     INNA nayak [id number]

## 2017-01-13 NOTE — DISCHARGE SUMMARY
[facility] Hospitalist Discharge Summary   Patient ID:  Candy Franks  [id number]  13 year old  4/19/1935    Admit date: 1/9/2017  Discharge date: 1/13/2017    Primary Care Physician: Cindy Tran MD. MA PANCHO with Dr Estefania Cline 1/17  Attending Physician: Brigid Jones PNA/Rhinovirus    -Tmax 100.4, WBC 15->16.4-->21-->20-->19.9 (in setting of steroids), PCT 0.13  -CXR with left basilar PNA. + hyperinflation, atelectasis/scarring  -RVP + rhinovirus  -sputum culture pending  -blood cx NGTD  -prn nebulizers/inhalers as SCD in place    Radiology:    CXR:   1. Left basilar pneumonia. 2. Atherosclerosis. 3. Hyperinflation. 4. Scarring/atelectasis. 5. Kyphoscoliosis. 6. Demineralization. 7. Osteoarthritis. 8. Right rotator cuff tear.   9. Chronic appearing wedging of m W/DEVICE Kit   1 Device by Does not apply route daily. escitalopram 20 MG Tabs   Commonly known as:  LEXAPRO   Take 1 tablet (20 mg total) by mouth once daily.        furosemide 40 MG Tabs   Commonly known as:  LASIX   Take 1 tablet (40 mg total) by m MG Caps  - Fluticasone Propionate 50 MCG/ACT Susp  - GuaiFENesin  MG Tb12  - predniSONE 20 MG Tabs  - umeclidinium-vilanterol 62.5-25 MCG/INH Aepb      You can get these medications from any pharmacy     Bring a paper prescription for each of these m PERRLA  Neck: Supple, no JVD  Pulm: coarse breath sounds bilatearlly  CV: RRR, no murmurs, 2+ peripheral pulses  ABD: Soft, non-tender, non-distended, +BS  MSK: strength 5/5 in all extremities  Neuro: Grossly normal, CN intact, sensory intact  Psych: Affec

## 2017-01-13 NOTE — HOME CARE LIAISON
MET WITH PTNT TO DISCUSS HOME HEALTH SERVICES AND COVERAGE CRITERIA. PTNT AGREEABLE TO [address] [place] [address]. PTNT GIVEN [facility] BROCHURE AND CONTACT INFORMATION. Lu RN/PT.   PTNT ADMITTED TO [facility] 1/9-1/13/17 D/T KELLEY

## 2017-01-16 ENCOUNTER — TELEPHONE (OUTPATIENT)
Dept: MEDSURG UNIT | Facility: HOSPITAL | Age: 82
End: 2017-01-16

## 2017-01-19 NOTE — PROGRESS NOTES
Quick Note:    It can be we have to wait for the final identification but it is important she see the ID service as soon as she can.  ______

## 2017-01-23 ENCOUNTER — APPOINTMENT (OUTPATIENT)
Dept: CT IMAGING | Facility: HOSPITAL | Age: 82
End: 2017-01-23
Attending: NURSE PRACTITIONER
Payer: MEDICARE

## 2017-01-23 ENCOUNTER — APPOINTMENT (OUTPATIENT)
Dept: GENERAL RADIOLOGY | Facility: HOSPITAL | Age: 82
End: 2017-01-23
Attending: NURSE PRACTITIONER
Payer: MEDICARE

## 2017-01-23 ENCOUNTER — HOSPITAL ENCOUNTER (EMERGENCY)
Facility: HOSPITAL | Age: 82
Discharge: HOME | End: 2017-01-23
Payer: MEDICARE

## 2017-01-23 VITALS
DIASTOLIC BLOOD PRESSURE: 37 MMHG | RESPIRATION RATE: 18 BRPM | BODY MASS INDEX: 43.23 KG/M2 | HEART RATE: 88 BPM | WEIGHT: 269 LBS | OXYGEN SATURATION: 100 % | SYSTOLIC BLOOD PRESSURE: 141 MMHG | HEIGHT: 66 IN

## 2017-01-23 DIAGNOSIS — S80.01XA CONTUSION OF RIGHT KNEE, INITIAL ENCOUNTER: ICD-10-CM

## 2017-01-23 DIAGNOSIS — S63.92XA HAND SPRAIN, LEFT, INITIAL ENCOUNTER: ICD-10-CM
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
## 2017-01-23 DIAGNOSIS — S00.83XA CONTUSION OF FACE, INITIAL ENCOUNTER: ICD-10-CM

## 2017-01-23 PROBLEM — E66.01 MORBID OBESITY WITH BMI OF 40.0-44.9, ADULT (HCC): Status: ACTIVE | Noted: 2017-01-23

## 2017-01-23 PROCEDURE — 99284 EMERGENCY DEPT VISIT MOD MDM: CPT

## 2017-01-23 PROCEDURE — 70450 CT HEAD/BRAIN W/O DYE: CPT

## 2017-01-23 PROCEDURE — 73130 X-RAY EXAM OF HAND: CPT

## 2017-01-23 PROCEDURE — 90471 IMMUNIZATION ADMIN: CPT

## 2017-01-23 PROCEDURE — 73562 X-RAY EXAM OF KNEE 3: CPT

## 2017-01-23 NOTE — ED INITIAL ASSESSMENT (HPI)
Pt arrived via medics to rm 15 for complaint of fall. States she tripped over her oxygen cord causing her to fall, +left facial bruising, swelling and abraision, also complaining of left wrist pain, denies loc.   States had sputum test last week for a coug

## 2017-01-23 NOTE — PROGRESS NOTES
Quick Note:    AFB culture pending, will await final ID.  Dr. Justo Mason notes reviewed, agree with ID consult.  ______

## 2017-01-23 NOTE — ED PROVIDER NOTES
Patient Seen in: [facility] AND [facility] Emergency Department    History   Patient presents with:  Musculoskeletal Problem    Stated Complaint: fall, left facial bruising, abraision, left wrist pain/injury    HPI    Patient presents into the emergency room for EXC SKIN BENIG 1.1-2CM TRUNK,ARM,LEG  1/28/2013    Comment Procedure: EXCISION OF LEG MASS;  Surgeon: Javed Farrell;   Location: [address] [facility]    BENIGN BIOPSY LEFT  1986       Medications :   umeclidinium-vilantero Problem Relation Age of Onset   • Heart Disorder Father      cva   • Breast Cancer Paternal Grandmother      age 79   • Heart Disorder Mother    • Stroke Brother          Smoking Status: Former Smoker                   Packs/Day: 2.00  Years: 45        Q sounds normal. She has no wheezes. She has no rales. Abdominal: Soft. Bowel sounds are normal. There is no tenderness. There is no rebound and no guarding. Musculoskeletal: Normal range of motion.    Focused exam of the left upper extremity: No palpable family notified of the results of her x-rays and CT findings, and notified patient to be discharged home      Disposition and Plan     Clinical Impression:  Fall from slip, trip, or stumble, initial encounter  (primary encounter diagnosis)  Hand sprain, le

## 2017-01-25 NOTE — PROGRESS NOTES
Quick Note:    Patient notified. Understands instructions, and verbalized agreement with plan. No further questions.     ______

## 2017-10-05 PROCEDURE — 86706 HEP B SURFACE ANTIBODY: CPT | Performed by: INTERNAL MEDICINE

## 2017-10-05 PROCEDURE — 87340 HEPATITIS B SURFACE AG IA: CPT | Performed by: INTERNAL MEDICINE

## 2017-10-05 PROCEDURE — 86704 HEP B CORE ANTIBODY TOTAL: CPT | Performed by: INTERNAL MEDICINE

## 2017-10-13 ENCOUNTER — APPOINTMENT (OUTPATIENT)
Dept: GENERAL RADIOLOGY | Facility: HOSPITAL | Age: 82
DRG: 683 | End: 2017-10-13
Payer: MEDICARE

## 2017-10-13 ENCOUNTER — HOSPITAL ENCOUNTER (INPATIENT)
Facility: HOSPITAL | Age: 82
LOS: 1 days | Discharge: HOME | DRG: 683 | End: 2017-10-14
Admitting: HOSPITALIST
Payer: MEDICARE

## 2017-10-13 DIAGNOSIS — N18.9 ACUTE RENAL FAILURE SUPERIMPOSED ON CHRONIC KIDNEY DISEASE, UNSPECIFIED CKD STAGE, UNSPECIFIED ACUTE RENAL FAILURE TYPE (HCC): ICD-10-CM

## 2017-10-13 DIAGNOSIS — C91.10 CLL (CHRONIC LYMPHOCYTIC LEUKEMIA) (HCC): ICD-10-CM

## 2017-10-13 DIAGNOSIS — N17.9 ACUTE RENAL FAILURE SUPERIMPOSED ON CHRONIC KIDNEY DISEASE, UNSPECIFIED CKD STAGE, UNSPECIFIED ACUTE RENAL FAILURE TYPE (HCC): ICD-10-CM

## 2017-10-13 DIAGNOSIS — N18.30 CONTROLLED TYPE 2 DIABETES MELLITUS WITH STAGE 3 CHRONIC KIDNEY DISEASE, WITHOUT LONG-TERM CURRENT USE OF INSULIN (HCC): ICD-10-CM

## 2017-10-13 DIAGNOSIS — E11.22 CONTROLLED TYPE 2 DIABETES MELLITUS WITH STAGE 3 CHRONIC KIDNEY DISEASE, WITHOUT LONG-TERM CURRENT USE OF INSULIN (HCC): ICD-10-CM

## 2017-10-13 DIAGNOSIS — E86.0 DEHYDRATION: ICD-10-CM

## 2017-10-13 DIAGNOSIS — E87.5 HYPERKALEMIA: Primary | ICD-10-CM

## 2017-10-13 PROCEDURE — 96360 HYDRATION IV INFUSION INIT: CPT

## 2017-10-13 PROCEDURE — 80048 BASIC METABOLIC PNL TOTAL CA: CPT

## 2017-10-13 PROCEDURE — 82436 ASSAY OF URINE CHLORIDE: CPT | Performed by: EMERGENCY MEDICINE

## 2017-10-13 PROCEDURE — 84100 ASSAY OF PHOSPHORUS: CPT | Performed by: EMERGENCY MEDICINE

## 2017-10-13 PROCEDURE — 83735 ASSAY OF MAGNESIUM: CPT | Performed by: EMERGENCY MEDICINE

## 2017-10-13 PROCEDURE — 71101 X-RAY EXAM UNILAT RIBS/CHEST: CPT

## 2017-10-13 PROCEDURE — 93005 ELECTROCARDIOGRAM TRACING: CPT

## 2017-10-13 PROCEDURE — 85025 COMPLETE CBC W/AUTO DIFF WBC: CPT

## 2017-10-13 PROCEDURE — 82962 GLUCOSE BLOOD TEST: CPT

## 2017-10-13 PROCEDURE — 84300 ASSAY OF URINE SODIUM: CPT | Performed by: EMERGENCY MEDICINE

## 2017-10-13 PROCEDURE — 84133 ASSAY OF URINE POTASSIUM: CPT | Performed by: EMERGENCY MEDICINE

## 2017-10-13 PROCEDURE — 82570 ASSAY OF URINE CREATININE: CPT | Performed by: EMERGENCY MEDICINE

## 2017-10-13 PROCEDURE — 81001 URINALYSIS AUTO W/SCOPE: CPT | Performed by: EMERGENCY MEDICINE

## 2017-10-13 PROCEDURE — 84550 ASSAY OF BLOOD/URIC ACID: CPT | Performed by: EMERGENCY MEDICINE

## 2017-10-13 PROCEDURE — 84484 ASSAY OF TROPONIN QUANT: CPT

## 2017-10-13 PROCEDURE — 93010 ELECTROCARDIOGRAM REPORT: CPT | Performed by: INTERNAL MEDICINE

## 2017-10-13 PROCEDURE — 83935 ASSAY OF URINE OSMOLALITY: CPT | Performed by: HOSPITALIST

## 2017-10-13 PROCEDURE — 99285 EMERGENCY DEPT VISIT HI MDM: CPT

## 2017-10-13 RX ORDER — ESCITALOPRAM OXALATE 10 MG/1
20 TABLET ORAL
Status: DISCONTINUED | OUTPATIENT
Start: 2017-10-13 | End: 2017-10-14

## 2017-10-13 RX ORDER — SODIUM CHLORIDE 0.9 % (FLUSH) 0.9 %
3 SYRINGE (ML) INJECTION AS NEEDED
Status: DISCONTINUED | OUTPATIENT
Start: 2017-10-13 | End: 2017-10-14

## 2017-10-13 RX ORDER — ALBUTEROL SULFATE 90 UG/1
2 AEROSOL, METERED RESPIRATORY (INHALATION) EVERY 4 HOURS PRN
Status: DISCONTINUED | OUTPATIENT
Start: 2017-10-13 | End: 2017-10-14

## 2017-10-13 RX ORDER — SODIUM POLYSTYRENE SULFONATE 15 G/60ML
30 SUSPENSION ORAL; RECTAL ONCE
Status: COMPLETED | OUTPATIENT
Start: 2017-10-13 | End: 2017-10-13

## 2017-10-13 RX ORDER — ALLOPURINOL 300 MG/1
300 TABLET ORAL DAILY
Status: DISCONTINUED | OUTPATIENT
Start: 2017-10-13 | End: 2017-10-14

## 2017-10-13 RX ORDER — POLYETHYLENE GLYCOL 3350 17 G/17G
17 POWDER, FOR SOLUTION ORAL DAILY PRN
Status: DISCONTINUED | OUTPATIENT
Start: 2017-10-13 | End: 2017-10-14

## 2017-10-13 RX ORDER — ONDANSETRON 2 MG/ML
4 INJECTION INTRAMUSCULAR; INTRAVENOUS EVERY 6 HOURS PRN
Status: DISCONTINUED | OUTPATIENT
Start: 2017-10-13 | End: 2017-10-14

## 2017-10-13 RX ORDER — CARVEDILOL 3.12 MG/1
3.12 TABLET ORAL 2 TIMES DAILY WITH MEALS
Status: DISCONTINUED | OUTPATIENT
Start: 2017-10-13 | End: 2017-10-14

## 2017-10-13 RX ORDER — HEPARIN SODIUM 5000 [USP'U]/ML
5000 INJECTION, SOLUTION INTRAVENOUS; SUBCUTANEOUS EVERY 8 HOURS SCHEDULED
Status: DISCONTINUED | OUTPATIENT
Start: 2017-10-13 | End: 2017-10-14

## 2017-10-13 RX ORDER — ACETAMINOPHEN 325 MG/1
650 TABLET ORAL EVERY 6 HOURS PRN
Status: DISCONTINUED | OUTPATIENT
Start: 2017-10-13 | End: 2017-10-14

## 2017-10-13 RX ORDER — SODIUM CHLORIDE 9 MG/ML
INJECTION, SOLUTION INTRAVENOUS CONTINUOUS
Status: DISCONTINUED | OUTPATIENT
Start: 2017-10-13 | End: 2017-10-14

## 2017-10-13 RX ORDER — DEXTROSE MONOHYDRATE 25 G/50ML
50 INJECTION, SOLUTION INTRAVENOUS AS NEEDED
Status: DISCONTINUED | OUTPATIENT
Start: 2017-10-13 | End: 2017-10-14

## 2017-10-13 RX ORDER — PANTOPRAZOLE SODIUM 40 MG/1
40 TABLET, DELAYED RELEASE ORAL DAILY
Status: DISCONTINUED | OUTPATIENT
Start: 2017-10-13 | End: 2017-10-14

## 2017-10-13 RX ORDER — LEVOTHYROXINE SODIUM 0.05 MG/1
50 TABLET ORAL
Status: DISCONTINUED | OUTPATIENT
Start: 2017-10-13 | End: 2017-10-14

## 2017-10-13 NOTE — ED NOTES
MD at bedside. Unable to transport pt to floor at this time. Put in new request for pt transport to floor.

## 2017-10-13 NOTE — ED PROVIDER NOTES
Patient Seen in: [facility] AND [facility] Emergency Department    History   Patient presents with:  Abnormal Result (metabolic, cardiac)    Stated Complaint: abn labs    HPI    24-year-old female with history of COPD, hypertension, hyperlipidemia, prior SVT, di of Onset   • Heart Disorder Father      cva   • Heart Disorder Mother    • Stroke Brother    • Breast Cancer Paternal Grandmother      age 79       Smoking status: Former Smoker                                                              Packs/day: 1.00 CO2 19 (*)      (*)     Creatinine 2.53 (*)     Calcium, Total 8.0 (*)     BUN/CREA Ratio 39.9 (*)     Calculated Osmolality 320 (*)     GFR, Non- 18 (*)     GFR, -American 22 (*)     All other components within normal limi Clinical Impression:  Hyperkalemia  (primary encounter diagnosis)  Dehydration  Acute renal failure superimposed on chronic kidney disease, unspecified CKD stage, unspecified acute renal failure type (HCC)  CLL (chronic lymphocytic leukemia) (HCC)    D

## 2017-10-13 NOTE — ED INITIAL ASSESSMENT (HPI)
Pt sent to ED by Dr. Steven Pelletier for abnl labs s/p 1st chemo Monday for CLL. Denies fever/chils. Denies pain.

## 2017-10-13 NOTE — ED NOTES
Gave report to [employer]. Endorsed collection of urine to receiving RN. Pt unable to void at this time.

## 2017-10-13 NOTE — H&P
[facility] Hospitalist H&P       CC: Patient presents with:  Abnormal Result (metabolic, cardiac)       PCP: Jeni Alberts MD    History of Present Illness: Patient is a 80year old female with PMH sig for COPD on O2 at night, with CKD stage 3, DM, SVT, HTN, Medications:    No outpatient prescriptions have been marked as taking for the 10/13/17 encounter [facility] Encounter).       Soc Hx     Smoking status: Former Smoker  1.00 Packs/day  For 38.00 Years     Start date: 2/1/1949    Quit date: 1/7/1987    Smokel AST  10*   ALB  3.6       Recent Labs   Lab  10/13/17   1132   TROP  0.00            Radiology: Xr Ribs With Chest (3 Views), Left  (cpt=71101)    Result Date: 10/13/2017  CONCLUSION:  No acute fracture dislocation. Mild cardiomegaly.  Tortuous thoracic a

## 2017-10-14 VITALS
HEART RATE: 80 BPM | RESPIRATION RATE: 20 BRPM | OXYGEN SATURATION: 99 % | HEIGHT: 64 IN | WEIGHT: 263 LBS | TEMPERATURE: 97 F | SYSTOLIC BLOOD PRESSURE: 114 MMHG | BODY MASS INDEX: 44.9 KG/M2 | DIASTOLIC BLOOD PRESSURE: 41 MMHG

## 2017-10-14 PROCEDURE — 80048 BASIC METABOLIC PNL TOTAL CA: CPT | Performed by: HOSPITALIST

## 2017-10-14 PROCEDURE — 83036 HEMOGLOBIN GLYCOSYLATED A1C: CPT | Performed by: HOSPITALIST

## 2017-10-14 PROCEDURE — 83735 ASSAY OF MAGNESIUM: CPT | Performed by: HOSPITALIST

## 2017-10-14 PROCEDURE — 84484 ASSAY OF TROPONIN QUANT: CPT | Performed by: HOSPITALIST

## 2017-10-14 PROCEDURE — 82962 GLUCOSE BLOOD TEST: CPT

## 2017-10-14 PROCEDURE — 85025 COMPLETE CBC W/AUTO DIFF WBC: CPT | Performed by: HOSPITALIST

## 2017-10-14 NOTE — CONSULTS
[facility]    PATIENT'S NAME: Lavenia Barthel   ATTENDING PHYSICIAN: Mike Lake MD   CONSULTING PHYSICIAN: Pa Najera MD   PATIENT ACCOUNT#:   [id number]    LOCATION:  [address] [place] [address] #:   [id number]       DATE OF BIRTH stones in the past and many years ago patient had what sounds like shockwave therapy with stents, which had been removed many years ago. History of COPD, history of SVT, history of hypothyroidism, history of CLL.       PAST SURGICAL HISTORY:  Relevant for DATA:  Sodium of 136, potassium of 6.0, chloride of 101, bicarb of 21, BUN of 33, creatinine of 2.67, glucose of 304, and calcium of 8.3. Alkaline phosphatase 85, AST 10, ALT of 28. Troponin 0.00. Albumin 3.6. Total protein 6.1.   Hepatitis B surface an to dexamethasone  and has received IV fluids in the ER. We will continue IV hydration. I thank Dr. Yeimy Douglass for the consult and appreciate the opportunity to see the patient. We will input on her condition regularly.      Dictated By Jc Carcamo

## 2017-10-14 NOTE — PROGRESS NOTES
[facility]    Progress Note    Luana Gottlieb Patient Status:  Inpatient    1935 MRN [id number]   Location [facility] 4W/SW/SE Attending Tiffanie Ohara MD   Hosp Day # 1 PCP Jose Jeffery MD       Subjective:    Jm Tara Scale Albuterol Sulfate  (90 Base) MCG/ACT inhaler 2 puff 2 puff Inhalation Q4H PRN   allopurinol (ZYLOPRIM) tab 300 mg 300 mg Oral Daily   carvedilol (COREG) tab 3.125 mg 3.125 mg Oral BID with meals   escitalopram (LEXAPRO) tablet 20 mg 20 mg Oral Daily CLL (chronic lymphocytic leukemia) ([facility]    Per He/Onc and dose chemo for GFR less than 15      Dehydration    Improved after IV fluids      Acute renal failure superimposed on chronic kidney disease, unspecified CKD stage, unspecified acute renal failur

## 2017-10-14 NOTE — DISCHARGE SUMMARY
General Medicine Discharge Summary     Patient ID:  Tammi Pimentel  80year old  4/19/1935    Admit date: 10/13/2017    Discharge date and time: 10/14/17    Attending Physician: Melanie Oh MD     Consults: IP CONSULT TO HOSPITALIST  IP CONSULT TO Zaira Barros well as amlodipine due to low normal BP. Patient also was started on insulin as BS were significantly elevated, DM education provided, and injection teaching was done. Patient will monitor BS closely and FU with PCP this week.   Patient to see PCP this we these medications    Albuterol Sulfate  (90 Base) MCG/ACT Aers  Inhale 2 puffs into the lungs every 4 (four) hours as needed for Wheezing. allopurinol 300 MG Tabs  Commonly known as:  ZYLOPRIM  Take 1 tablet (300 mg total) by mouth daily.      BA known as:  DECADRON     furosemide 40 MG Tabs  Commonly known as:  LASIX     glimepiride 2 MG Tabs  Commonly known as:  AMARYL     Insulin Degludec 100 UNIT/ML Sopn  Commonly known as:  TRESIBA FLEXTOUCH     Insulin Lispro 100 UNIT/ML Sopn  Commonly known nightly. What changed:  how much to take        CONTINUE taking these medications    Albuterol Sulfate  (90 Base) MCG/ACT Aers  Inhale 2 puffs into the lungs every 4 (four) hours as needed for Wheezing.      allopurinol 300 MG Tabs  Commonly known a Tabs  Commonly known as:  NORVASC     dexamethasone 4 MG tablet  Commonly known as:  DECADRON     furosemide 40 MG Tabs  Commonly known as:  LASIX     glimepiride 2 MG Tabs  Commonly known as:  AMARYL     Insulin Degludec 100 UNIT/ML Sopn  Commonly known a

## 2017-10-14 NOTE — PHYSICAL THERAPY NOTE
Chart reviewed, K noted to be 5.2. Spoke with RN Leticia Healy who reports that the pt is up in the room, ambulated under supervision to manage IV pole. Plan to see tomorrow for wilde way walking and stairs navigation once K is < 5.

## 2017-10-15 ENCOUNTER — TELEPHONE (OUTPATIENT)
Dept: CARDIOLOGY UNIT | Facility: HOSPITAL | Age: 82
End: 2017-10-15

## 2017-10-19 ENCOUNTER — APPOINTMENT (OUTPATIENT)
Dept: GENERAL RADIOLOGY | Facility: HOSPITAL | Age: 82
End: 2017-10-19
Attending: EMERGENCY MEDICINE
Payer: MEDICARE

## 2017-10-19 ENCOUNTER — HOSPITAL ENCOUNTER (EMERGENCY)
Facility: HOSPITAL | Age: 82
Discharge: HOME | End: 2017-10-20
Attending: EMERGENCY MEDICINE
Payer: MEDICARE

## 2017-10-19 DIAGNOSIS — C91.10 CLL (CHRONIC LYMPHOCYTIC LEUKEMIA) (HCC): ICD-10-CM

## 2017-10-19 DIAGNOSIS — D61.818 PANCYTOPENIA (HCC): ICD-10-CM

## 2017-10-19 DIAGNOSIS — R50.9 FEVER, UNSPECIFIED FEVER CAUSE: Primary | ICD-10-CM

## 2017-10-19 PROCEDURE — 87581 M.PNEUMON DNA AMP PROBE: CPT | Performed by: EMERGENCY MEDICINE

## 2017-10-19 PROCEDURE — 80048 BASIC METABOLIC PNL TOTAL CA: CPT | Performed by: EMERGENCY MEDICINE

## 2017-10-19 PROCEDURE — 99284 EMERGENCY DEPT VISIT MOD MDM: CPT

## 2017-10-19 PROCEDURE — 87633 RESP VIRUS 12-25 TARGETS: CPT | Performed by: EMERGENCY MEDICINE

## 2017-10-19 PROCEDURE — 71020 XR CHEST PA + LAT CHEST (CPT=71020): CPT | Performed by: EMERGENCY MEDICINE

## 2017-10-19 PROCEDURE — 87798 DETECT AGENT NOS DNA AMP: CPT | Performed by: EMERGENCY MEDICINE

## 2017-10-19 PROCEDURE — 85025 COMPLETE CBC W/AUTO DIFF WBC: CPT | Performed by: EMERGENCY MEDICINE

## 2017-10-19 PROCEDURE — 87486 CHLMYD PNEUM DNA AMP PROBE: CPT | Performed by: EMERGENCY MEDICINE

## 2017-10-19 PROCEDURE — 36415 COLL VENOUS BLD VENIPUNCTURE: CPT

## 2017-10-20 VITALS
RESPIRATION RATE: 18 BRPM | HEART RATE: 72 BPM | WEIGHT: 257 LBS | TEMPERATURE: 98 F | DIASTOLIC BLOOD PRESSURE: 57 MMHG | OXYGEN SATURATION: 98 % | BODY MASS INDEX: 47 KG/M2 | SYSTOLIC BLOOD PRESSURE: 134 MMHG

## 2017-10-20 PROCEDURE — 81001 URINALYSIS AUTO W/SCOPE: CPT | Performed by: EMERGENCY MEDICINE

## 2017-10-20 PROCEDURE — 87040 BLOOD CULTURE FOR BACTERIA: CPT | Performed by: EMERGENCY MEDICINE

## 2017-10-20 PROCEDURE — 87086 URINE CULTURE/COLONY COUNT: CPT | Performed by: EMERGENCY MEDICINE

## 2017-10-20 NOTE — ED INITIAL ASSESSMENT (HPI)
Pt here after having fever tonight of 101.5. Took advil at home.  Was on chemo with last dose given last week Wednesday

## 2017-10-20 NOTE — ED PROVIDER NOTES
Patient Seen in: [facility] Emergency Department    History   Patient presents with:  Fever (infectious)    Stated Complaint: fever; last chemo tx mon/tuesday last week    HPI    81 y/o female w/ CLL w/ start of new chemo regimen 1.5 wks ago w/ fe use: No                Review of Systems    Positive for stated complaint: fever; last chemo tx mon/tuesday last week  Other systems are as noted in HPI. Constitutional and vital signs reviewed.       All other systems reviewed and negative except as noted 46 (*)     All other components within normal limits   URINALYSIS WITH CULTURE REFLEX - Abnormal; Notable for the following:     Clarity Urine Hazy (*)     Urobilinogen Urine 2.0 (*)     Leukocyte Esterase Urine Small (*)     WBC Urine 10 (*)     All other reliable and can follow-up as instructed. Family at bedside as well.       Disposition and Plan     Clinical Impression:  Fever, unspecified fever cause  (primary encounter diagnosis)  CLL (chronic lymphocytic leukemia) (HCC)  [facility] ([facility]    Disposi

## 2017-10-24 ENCOUNTER — LAB ENCOUNTER (OUTPATIENT)
Dept: LAB | Facility: HOSPITAL | Age: 82
DRG: 178 | End: 2017-10-24
Attending: INTERNAL MEDICINE
Payer: MEDICARE

## 2017-10-24 DIAGNOSIS — C91.10 CHRONIC LYMPHOID LEUKEMIA, WITHOUT MENTION OF HAVING ACHIEVED REMISSION(204.10): ICD-10-CM

## 2017-10-24 PROBLEM — D64.9 ANEMIA: Status: ACTIVE | Noted: 2017-10-24

## 2017-10-24 PROCEDURE — 86920 COMPATIBILITY TEST SPIN: CPT

## 2017-10-24 PROCEDURE — 36415 COLL VENOUS BLD VENIPUNCTURE: CPT

## 2017-10-24 PROCEDURE — 86850 RBC ANTIBODY SCREEN: CPT

## 2017-10-24 PROCEDURE — 86901 BLOOD TYPING SEROLOGIC RH(D): CPT

## 2017-10-24 PROCEDURE — 86900 BLOOD TYPING SEROLOGIC ABO: CPT

## 2017-10-25 ENCOUNTER — APPOINTMENT (OUTPATIENT)
Dept: GENERAL RADIOLOGY | Facility: HOSPITAL | Age: 82
DRG: 178 | End: 2017-10-25
Attending: EMERGENCY MEDICINE
Payer: MEDICARE

## 2017-10-25 ENCOUNTER — HOSPITAL ENCOUNTER (INPATIENT)
Facility: HOSPITAL | Age: 82
LOS: 7 days | Discharge: HOME HEALTH | DRG: 178 | End: 2017-11-02
Attending: EMERGENCY MEDICINE | Admitting: HOSPITALIST
Payer: MEDICARE

## 2017-10-25 ENCOUNTER — OFFICE VISIT (OUTPATIENT)
Dept: HEMATOLOGY/ONCOLOGY | Facility: HOSPITAL | Age: 82
DRG: 178 | End: 2017-10-25
Attending: INTERNAL MEDICINE
Payer: MEDICARE

## 2017-10-25 VITALS
SYSTOLIC BLOOD PRESSURE: 133 MMHG | DIASTOLIC BLOOD PRESSURE: 37 MMHG | TEMPERATURE: 99 F | RESPIRATION RATE: 18 BRPM | HEART RATE: 74 BPM

## 2017-10-25 DIAGNOSIS — D64.9 SYMPTOMATIC ANEMIA: Primary | ICD-10-CM

## 2017-10-25 DIAGNOSIS — C91.10 CLL (CHRONIC LYMPHOCYTIC LEUKEMIA) (HCC): ICD-10-CM

## 2017-10-25 DIAGNOSIS — R79.89 ELEVATED LIVER FUNCTION TESTS: ICD-10-CM

## 2017-10-25 DIAGNOSIS — D72.819 LEUKOPENIA, UNSPECIFIED TYPE: ICD-10-CM

## 2017-10-25 DIAGNOSIS — N18.30 CONTROLLED TYPE 2 DIABETES MELLITUS WITH STAGE 3 CHRONIC KIDNEY DISEASE, WITHOUT LONG-TERM CURRENT USE OF INSULIN (HCC): ICD-10-CM

## 2017-10-25 DIAGNOSIS — N28.9 RENAL INSUFFICIENCY: ICD-10-CM

## 2017-10-25 DIAGNOSIS — E11.22 CONTROLLED TYPE 2 DIABETES MELLITUS WITH STAGE 3 CHRONIC KIDNEY DISEASE, WITHOUT LONG-TERM CURRENT USE OF INSULIN (HCC): ICD-10-CM

## 2017-10-25 DIAGNOSIS — D64.9 ANEMIA: Primary | ICD-10-CM

## 2017-10-25 DIAGNOSIS — R06.00 DYSPNEA, UNSPECIFIED TYPE: ICD-10-CM

## 2017-10-25 PROCEDURE — 36430 TRANSFUSION BLD/BLD COMPNT: CPT

## 2017-10-25 PROCEDURE — 3E0F76Z INTRODUCTION OF NUTRITIONAL SUBSTANCE INTO RESPIRATORY TRACT, VIA NATURAL OR ARTIFICIAL OPENING: ICD-10-PCS | Performed by: HOSPITALIST

## 2017-10-25 PROCEDURE — 71010 XR CHEST AP PORTABLE  (CPT=71010): CPT | Performed by: EMERGENCY MEDICINE

## 2017-10-25 RX ORDER — DIPHENHYDRAMINE HCL 25 MG
25 CAPSULE ORAL ONCE
Status: COMPLETED | OUTPATIENT
Start: 2017-10-25 | End: 2017-10-25

## 2017-10-25 RX ORDER — ACETAMINOPHEN 325 MG/1
TABLET ORAL
Status: COMPLETED
Start: 2017-10-25 | End: 2017-10-25

## 2017-10-25 RX ORDER — DIPHENHYDRAMINE HCL 25 MG
25 CAPSULE ORAL ONCE
Status: CANCELLED | OUTPATIENT
Start: 2017-10-25

## 2017-10-25 RX ORDER — SODIUM CHLORIDE 9 MG/ML
INJECTION, SOLUTION INTRAVENOUS
Status: DISCONTINUED
Start: 2017-10-25 | End: 2017-10-25

## 2017-10-25 RX ORDER — IPRATROPIUM BROMIDE AND ALBUTEROL SULFATE 2.5; .5 MG/3ML; MG/3ML
3 SOLUTION RESPIRATORY (INHALATION) ONCE
Status: COMPLETED | OUTPATIENT
Start: 2017-10-25 | End: 2017-10-25

## 2017-10-25 RX ORDER — ACETAMINOPHEN 325 MG/1
650 TABLET ORAL ONCE
Status: CANCELLED | OUTPATIENT
Start: 2017-10-25

## 2017-10-25 RX ORDER — DIPHENHYDRAMINE HCL 25 MG
CAPSULE ORAL
Status: COMPLETED
Start: 2017-10-25 | End: 2017-10-25

## 2017-10-25 RX ORDER — ACETAMINOPHEN 325 MG/1
650 TABLET ORAL ONCE
Status: COMPLETED | OUTPATIENT
Start: 2017-10-25 | End: 2017-10-25

## 2017-10-25 RX ORDER — 0.9 % SODIUM CHLORIDE 0.9 %
VIAL (ML) INJECTION
Status: DISCONTINUED
Start: 2017-10-25 | End: 2017-10-25

## 2017-10-25 RX ADMIN — DIPHENHYDRAMINE HCL 25 MG: 25 MG CAPSULE ORAL at 09:34:00

## 2017-10-25 RX ADMIN — ACETAMINOPHEN 650 MG: 325 TABLET ORAL at 09:33:00

## 2017-10-25 NOTE — PROGRESS NOTES
Pt here for blood transfusion. To receive 1 unit(s). Lab Results  Component Value Date   HGB 8.1 (L) 10/19/2017   HCT 25.0 (L) 10/19/2017     Reports she is very fatigued and SOB at times. Patient reports she does have a history of emphysema.  Patient educ

## 2017-10-26 ENCOUNTER — APPOINTMENT (OUTPATIENT)
Dept: CV DIAGNOSTICS | Facility: HOSPITAL | Age: 82
DRG: 178 | End: 2017-10-26
Attending: INTERNAL MEDICINE
Payer: MEDICARE

## 2017-10-26 ENCOUNTER — APPOINTMENT (OUTPATIENT)
Dept: NUCLEAR MEDICINE | Facility: HOSPITAL | Age: 82
DRG: 178 | End: 2017-10-26
Attending: HOSPITALIST
Payer: MEDICARE

## 2017-10-26 ENCOUNTER — APPOINTMENT (OUTPATIENT)
Dept: ULTRASOUND IMAGING | Facility: HOSPITAL | Age: 82
DRG: 178 | End: 2017-10-26
Attending: HOSPITALIST
Payer: MEDICARE

## 2017-10-26 PROBLEM — R06.00 DYSPNEA, UNSPECIFIED TYPE: Status: ACTIVE | Noted: 2017-10-26

## 2017-10-26 PROBLEM — D64.9 SYMPTOMATIC ANEMIA: Status: ACTIVE | Noted: 2017-10-26

## 2017-10-26 PROBLEM — D70.9 NEUTROPENIC FEVER (HCC): Status: ACTIVE | Noted: 2017-10-26

## 2017-10-26 PROBLEM — R50.81 NEUTROPENIC FEVER (HCC): Status: ACTIVE | Noted: 2017-10-26

## 2017-10-26 PROBLEM — R50.81 NEUTROPENIC FEVER: Status: ACTIVE | Noted: 2017-10-26

## 2017-10-26 PROBLEM — D72.819 LEUKOPENIA, UNSPECIFIED TYPE: Status: ACTIVE | Noted: 2017-10-26

## 2017-10-26 PROBLEM — D70.9 NEUTROPENIC FEVER: Status: ACTIVE | Noted: 2017-10-26

## 2017-10-26 PROCEDURE — 93306 TTE W/DOPPLER COMPLETE: CPT | Performed by: INTERNAL MEDICINE

## 2017-10-26 PROCEDURE — 78582 LUNG VENTILAT&PERFUS IMAGING: CPT | Performed by: HOSPITALIST

## 2017-10-26 PROCEDURE — 93970 EXTREMITY STUDY: CPT | Performed by: HOSPITALIST

## 2017-10-26 RX ORDER — ESCITALOPRAM OXALATE 10 MG/1
20 TABLET ORAL
Status: DISCONTINUED | OUTPATIENT
Start: 2017-10-26 | End: 2017-11-02

## 2017-10-26 RX ORDER — IPRATROPIUM BROMIDE AND ALBUTEROL SULFATE 2.5; .5 MG/3ML; MG/3ML
3 SOLUTION RESPIRATORY (INHALATION) EVERY 4 HOURS PRN
Status: DISCONTINUED | OUTPATIENT
Start: 2017-10-26 | End: 2017-10-28

## 2017-10-26 RX ORDER — SODIUM CHLORIDE 9 MG/ML
INJECTION, SOLUTION INTRAVENOUS
Status: DISPENSED
Start: 2017-10-26 | End: 2017-10-26

## 2017-10-26 RX ORDER — IPRATROPIUM BROMIDE AND ALBUTEROL SULFATE 2.5; .5 MG/3ML; MG/3ML
3 SOLUTION RESPIRATORY (INHALATION) EVERY 2 HOUR PRN
Status: DISCONTINUED | OUTPATIENT
Start: 2017-10-26 | End: 2017-10-26

## 2017-10-26 RX ORDER — ALBUTEROL SULFATE 90 UG/1
2 AEROSOL, METERED RESPIRATORY (INHALATION) EVERY 4 HOURS PRN
Status: DISCONTINUED | OUTPATIENT
Start: 2017-10-26 | End: 2017-11-02

## 2017-10-26 RX ORDER — HEPARIN SODIUM 5000 [USP'U]/ML
5000 INJECTION, SOLUTION INTRAVENOUS; SUBCUTANEOUS EVERY 8 HOURS
Status: DISCONTINUED | OUTPATIENT
Start: 2017-10-26 | End: 2017-10-26

## 2017-10-26 RX ORDER — LEVOTHYROXINE SODIUM 0.05 MG/1
50 TABLET ORAL
Status: DISCONTINUED | OUTPATIENT
Start: 2017-10-26 | End: 2017-11-02

## 2017-10-26 RX ORDER — ACETAMINOPHEN 325 MG/1
650 TABLET ORAL EVERY 6 HOURS PRN
Status: DISCONTINUED | OUTPATIENT
Start: 2017-10-26 | End: 2017-11-02

## 2017-10-26 RX ORDER — PANTOPRAZOLE SODIUM 40 MG/1
40 TABLET, DELAYED RELEASE ORAL DAILY
Status: DISCONTINUED | OUTPATIENT
Start: 2017-10-26 | End: 2017-11-02

## 2017-10-26 RX ORDER — DEXTROSE MONOHYDRATE 25 G/50ML
50 INJECTION, SOLUTION INTRAVENOUS AS NEEDED
Status: DISCONTINUED | OUTPATIENT
Start: 2017-10-26 | End: 2017-11-02

## 2017-10-26 RX ORDER — SODIUM CHLORIDE 0.9 % (FLUSH) 0.9 %
3 SYRINGE (ML) INJECTION AS NEEDED
Status: DISCONTINUED | OUTPATIENT
Start: 2017-10-26 | End: 2017-11-02

## 2017-10-26 RX ORDER — FUROSEMIDE 10 MG/ML
40 INJECTION INTRAMUSCULAR; INTRAVENOUS ONCE
Status: COMPLETED | OUTPATIENT
Start: 2017-10-26 | End: 2017-10-26

## 2017-10-26 RX ORDER — ATORVASTATIN CALCIUM 10 MG/1
10 TABLET, FILM COATED ORAL NIGHTLY
Status: DISCONTINUED | OUTPATIENT
Start: 2017-10-26 | End: 2017-10-30

## 2017-10-26 RX ORDER — SODIUM CHLORIDE 9 MG/ML
INJECTION, SOLUTION INTRAVENOUS CONTINUOUS
Status: DISCONTINUED | OUTPATIENT
Start: 2017-10-26 | End: 2017-10-27

## 2017-10-26 RX ORDER — ONDANSETRON 2 MG/ML
4 INJECTION INTRAMUSCULAR; INTRAVENOUS EVERY 6 HOURS PRN
Status: DISCONTINUED | OUTPATIENT
Start: 2017-10-26 | End: 2017-11-02

## 2017-10-26 RX ORDER — HEPARIN SODIUM 5000 [USP'U]/ML
5000 INJECTION, SOLUTION INTRAVENOUS; SUBCUTANEOUS EVERY 8 HOURS
Status: DISCONTINUED | OUTPATIENT
Start: 2017-10-26 | End: 2017-11-02

## 2017-10-26 NOTE — ED NOTES
Pt c/o sob for the past day. Pt states that she had a blood transfusion today and developed sob later today with dizziness and headache. Pt denies n/v/d, numbness/tingling, or cp. Pt states that she has CLL and has been having intermittent fevers/chills.

## 2017-10-26 NOTE — CONSULTS
[facility] AND CLINICS  [facility] Infectious Disease  Report of Consultation    Hayley Gatica Patient Status:  Inpatient    1935 MRN [id number]   Location [facility] 4W/SW/SE Attending Emilio Pham MD   Hosp Day # 0 PCP Medrado Wright MD     Date of TRUNK,ARM,LEG      Comment: Procedure: [id number] OF LEG MASS;  Surgeon:                Vanessa Mckeon;   Location: [address] [place] [address],[address]  Family History   Problem Relation Age of Onset   • Heart Disorder Father      cva   • Heart Disorder nebulizer solution 3 mL, 3 mL, Nebulization, Q4H PRN  •  0.9%  NaCl infusion, , Intravenous, Continuous  •  sodium chloride 0.9 % infusion, , ,   •  Heparin Sodium (Porcine) 5000 UNIT/ML injection 5,000 Units, 5,000 Units, Subcutaneous, Q8H    Review of Sy Normocephalic, without obvious abnormality, atraumatic. Eyes: Conjunctivae/corneas clear. No scleral icterus. No conjunctival     hemorrhage. Nose: Nares normal.   Throat:  Oropharynx clear, MMs moist.   Neck: Trachea ML, no masses.    Lungs: CTA b/l [phone]    10/26/2017  12:59 PM

## 2017-10-26 NOTE — ED PROVIDER NOTES
Patient Seen in: [facility] AND [facility] Emergency Department    History   Patient presents with:  Dyspnea ALEC SOB (respiratory)    Stated Complaint: sob    HPI    79 yo F with PMH SVT, HTN, DM, COPD on nocturnal oxygen, recent CLL diagnosis with initiation of c each by Does not apply route daily. insulin glargine (LANTUS SOLOSTAR) 100 UNIT/ML Subcutaneous Solution Pen-injector,  Inject 15 Units into the skin nightly.    JANUVIA 25 MG Oral Tab,  TAKE ONE TABLET BY MOUTH EVERY MORNING WITH BREAKFAST   ESCITALOPRAM Alcohol use: No                Review of Systems :  Constitutional: As per HPI  Respiratory: (+) cough and shortness of breath. Cardiovascular: Negative for chest pain and palpitations. Gastrointestinal: Negative for vomiting and abdominal pain. RDW 15.1 (*)     PLT 89 (*)     Neutrophil Absolute 0.7 (*)     Lymphocyte Absolute 0.4 (*)     All other components within normal limits   TROPONIN I, 0 HOUR - Normal   CBC WITH DIFFERENTIAL WITH PLATELET    Narrative:      The following orders were cre contents of this report is strictly prohibited and may be unlawful. If you have received this report in error, please notify the sender immediately at [phone] and permanently delete the original report and destroy any copies or printouts.     A total o

## 2017-10-26 NOTE — CONSULTS
Pulmonary Consult     Assessment / Plan:  1. Dyspnea / hypoxia - DDx includes PE vs pneumonitis related to chemo vs volume overload vs AECOPD (less likely)  - V/Q scan and LE duplex  - pending results may need CT chest and TTE  2.  COPD  - Anoro  - bd sun Prescriptions Prior to Admission:  insulin aspart (NOVOLOG) 100 UNIT/ML Subcutaneous Solution Sliding scale before meals as directed Disp: 3 vial Rfl: 3 Taking   Insulin Pen Needle 33G X 6 MM Does not apply Misc 1 each by Does not apply route daily.  Sabrina Matos MOUTH   DAILY Disp: 90 tablet Rfl: 3 Taking   ONETOUCH LANCETS Does not apply Misc Test once daily Disp: 200 each Rfl: 5 Taking   Pantoprazole Sodium 40 MG Oral Tab EC Take 1 tablet (40 mg total) by mouth daily.  Disp: 90 tablet Rfl: 3 Taking       Outpatie ULTRA BLUE) In Vitro Strip Use as directed Disp: 200 strip Rfl: 3   Lancets (LIFESCAN UNISTIK 2) Does not apply Misc 100 Disp: 100 each Rfl: 3   Levothyroxine Sodium 50 MCG Oral Tab TAKE 1 TABLET BY MOUTH   DAILY Disp: 90 tablet Rfl: 3   ONETOUCH LANCETS D appropriately    Labs:  Reviewed in EMR    Inpatient Medications:  Reviewed in EMR    Imaging:   CXR 10/26/17 -  1. Stable findings from October 19, 2017.  2. Borderline cardiomegaly. 3. Atherosclerosis. 4. Scarring/atelectasis. 5. Scoliosis.   6. Demine

## 2017-10-26 NOTE — H&P
[facility] Hospitalist Team  History and Physical     ASSESSMENT / PLAN:   Patient is a 80year old female with PMH sig for COPD on O2 at night, with CKD stage 3, DM Type II-recently placed on insulin, SVT, HTN, HLD,depression, Hypothyroidism,  CLL on chemo and r norvasc and losartan.   Now on coreg  - resume BB     Hypothyroidism  - continue home meds    HL  -statin    Depression  -continue home meds    FEN  -lytes in am  -IVF  -diet-ADA    Prophy  -SCD  -heparin    Dispo  -pending clinical coarse  PCP: Elzbieta Howell bruits   RESPIRATORY: normal expansion; non labored; CTA   CARDIOVASCULAR: regular,nl S1 S2; no murmur, no gallop; no rub   ABDOMEN:  Soft, BS+; nondistended; non tender;no masses;    EXTREMITIES: no edema; no cyanosis;, no calf tenderness; peripheral pulse insulin glargine (LANTUS SOLOSTAR) 100 UNIT/ML Subcutaneous Solution Pen-injector Inject 15 Units into the skin nightly.  Disp: 3 mL Rfl: 3   JANUVIA 25 MG Oral Tab TAKE ONE TABLET BY MOUTH EVERY MORNING WITH BREAKFAST Disp: 90 tablet Rfl: 1   ESCITALOPRA Never Used    Alcohol use No        Fam Hx  Family History   Problem Relation Age of Onset   • Heart Disorder Father      cva   • Heart Disorder Mother    • Stroke Brother    • Breast Cancer Paternal Grandmother      age 79       Review of Systems  A compr denies chest pain, no nausea or vomiting, no headaches, but does have fevers, no other complaints    objective  /37 (BP Location: Left arm)   Pulse 81   Temp 98.4 °F (36.9 °C) (Oral)   Resp 22   Ht 5' 4\" (1.626 m)   Wt 257 lb 11.2 oz (116.9 kg)   Sp units at night, on januvia as well  -hold januvia, continue home lantus 15 units at night with sliding scale, adjust as needed  -accuchecks  -ADA diet     HTN/SVT  -has been off lasix, norvasc and losartan.   Now on coreg  - resume BB     Hypothyroidism  -

## 2017-10-26 NOTE — CM/SW NOTE
CTL  Discharge rounds:  PT/OT orders present. Pt from home with family. Hx of home 02 use, has home 02. CTL to follow.

## 2017-10-26 NOTE — CONSULTS
[facility]    PATIENT'S NAME: Luis Camarillo   ATTENDING PHYSICIAN: Mike Beltran MD   CONSULTING PHYSICIAN: Hayley Cabrera MD   PATIENT ACCOUNT#:   [id number]    LOCATION:  [address] [place] [address] Drive #:   [id number]       DATE OF BIRTH: hearing changes. She denies any sore throat. There is shortness of breath. She has a cough that started yesterday. The shortness of breath has been going on for a long time but worse over the past week. She denies any chest pain.   There are no palpita couple of weeks, with some anemia and thrombocytopenia. She recently did get a transfusion for anemia. 3.   Diabetes mellitus. 4.   Hypertension, mostly controlled. 5.   Hypercholesterolemia, for which she had been on statin.   6.   History of supravent

## 2017-10-26 NOTE — PLAN OF CARE
HEMATOLOGIC - ADULT    • Maintains hematologic stability Progressing        Patient/Family Goals    • Patient/Family Long Term Goal Progressing    • Patient/Family Short Term Goal Progressing        RISK FOR INFECTION - ADULT    • Absence of fever/infectio

## 2017-10-26 NOTE — CONSULTS
Hematology/Oncology Consult Note        NAME: Mona Fonseca - ROOM: [address] [place] [address]- - MRN: [id number] - Age: 80year old - : 1935    Reason for Consult:  CLL, fever    Lawson Jay is an 80 y.o female with history of CLL recently started on bendamustine and rit LEG MASS;  Surgeon:                Sunitha Mendez; Location: [address] [place] [address],[address]  1/28/2013: [address] 1.1-2CM TRUNK,ARM,LEG      Comment: Procedure: [id number] OF LEG MASS;  Surgeon:                Sunitha Mendez;   Location: [facility] Exam:  /37 (BP Location: Left arm)   Pulse 81   Temp 98.4 °F (36.9 °C) (Oral)   Resp 22   Ht 1.626 m (5' 4\")   Wt 116.9 kg (257 lb 11.2 oz)   SpO2 95%   BMI 44.23 kg/m²   Physical Exam:   General: alert, cooperative, no respiratory distress.    Head:

## 2017-10-26 NOTE — PROGRESS NOTES
[facility] Pharmacy Note:  Renal Adjustment for Maxipime (cefepime)    Ari Adam is a 80year old female who has been prescribed Maxipime (cefepime) 1 g every 8 hrs. CrCl is estimated creatinine clearance is 25.5 mL/min (based on SCr of 1.47 mg/dL).  so the

## 2017-10-26 NOTE — CM/SW NOTE
CHRISTINA met with the patient's daughter at bedside. The patient lives with her , daughter, TONNY and son in a 2 story home with stairs.   She has a walker and home oxygen([employer]) and has been independent with her care although recently she has been struggl

## 2017-10-27 ENCOUNTER — APPOINTMENT (OUTPATIENT)
Dept: CV DIAGNOSTICS | Facility: HOSPITAL | Age: 82
DRG: 178 | End: 2017-10-27
Attending: INTERNAL MEDICINE
Payer: MEDICARE

## 2017-10-27 ENCOUNTER — APPOINTMENT (OUTPATIENT)
Dept: CT IMAGING | Facility: HOSPITAL | Age: 82
DRG: 178 | End: 2017-10-27
Attending: INTERNAL MEDICINE
Payer: MEDICARE

## 2017-10-27 ENCOUNTER — APPOINTMENT (OUTPATIENT)
Dept: NUCLEAR MEDICINE | Facility: HOSPITAL | Age: 82
DRG: 178 | End: 2017-10-27
Attending: INTERNAL MEDICINE
Payer: MEDICARE

## 2017-10-27 PROCEDURE — 71250 CT THORAX DX C-: CPT | Performed by: INTERNAL MEDICINE

## 2017-10-27 PROCEDURE — 78452 HT MUSCLE IMAGE SPECT MULT: CPT | Performed by: INTERNAL MEDICINE

## 2017-10-27 PROCEDURE — 30233N1 TRANSFUSION OF NONAUTOLOGOUS RED BLOOD CELLS INTO PERIPHERAL VEIN, PERCUTANEOUS APPROACH: ICD-10-PCS | Performed by: HOSPITALIST

## 2017-10-27 PROCEDURE — 93017 CV STRESS TEST TRACING ONLY: CPT | Performed by: INTERNAL MEDICINE

## 2017-10-27 RX ORDER — 0.9 % SODIUM CHLORIDE 0.9 %
VIAL (ML) INJECTION
Status: COMPLETED
Start: 2017-10-27 | End: 2017-10-27

## 2017-10-27 NOTE — PROGRESS NOTES
[facility] AND [facility] Infectious Disease Progress Note    Chicot Coad Patient Status:  Inpatient    1935 MRN [id number]   Location [facility] 4W/SW/SE Attending Callie Lucio MD   Hosp Day # 1 PCP Anu Darnell MD     Subjective:  Patient Units, Subcutaneous, Q8H    Physical Exam:  General: Alert, orientated x3. Cooperative. No apparent distress. Vital Signs:  Blood pressure 150/51, pulse 88, temperature 98.7 °F (37.1 °C), temperature source Oral, resp.  rate 20, height 5' 4\" (1.626 m), following  - VQ negative, CXR negative  - US negative for DVT  - Some symptomatic anemia as well     3.  H/o CLL with anemia  - s/p transfusion  - Last chemo 10/3, next scheduled 11/6     4.   DM II  - Needs tight glycemic control for optimal treatment of h

## 2017-10-27 NOTE — PROGRESS NOTES
[facility] Hospitalist Team  Progress Note    Paulette Barrientos Patient Status:  Inpatient    1935 MRN [id number]   Location [facility] 4W/SW/SE Attending Levi Conklin MD   Hosp Day # 1 PCP Janet Mckeon MD     CC: Follow Up  PCP: Janet Mckeon MD CLL  -hgb 8.1 on 10/19 got 1 unit as outpt on 10/25 follow up hgb 7.5 in ER--> transfused 1 unit -->8.9  -follow hgb     COPD  -continue home inhalers, prn nebs     CKD  -recently D/C 10/14 with Cr 2, admit 1.2-->1.4  -follow Cr  -follows with Dr Wilfred Sultana Labs   Lab  10/25/17   2308  10/26/17   0617  10/27/17   0604   NA  133*  135*  135*   K  4.1  4.4  4.3   CL  110  106  107   CO2  17*  20*  21*   BUN  18  18  18   CREATSERUM  1.28  1.47  1.39   CA  7.8*  8.1*  8.0*   GLU  264*  185*  142*       No result Venous Doppler Leg Bilat - Diag Img (cpt=93970)    Result Date: 10/26/2017  CONCLUSION:  1. Somewhat limited examination, but no gross evidence of deep venous thrombosis in the lower extremities bilaterally.          Xr Chest Ap Portable  (cpt=71010)    Res SOB, previous hs lung nodules-stable, b/L groundglass opacities.  Previous echo 1/2017 with EF 80-19%, diastolic dysfunction, PAP 36, rec stress test but appears pt did not get  -per pt she has been using O2 at night at 2L but since starting chemo has neede

## 2017-10-27 NOTE — PHYSICAL THERAPY NOTE
Chart reviewed      Pt  is not available this AM as at a stress test----- RN thinks she may return approx 11:30 PM

## 2017-10-27 NOTE — OCCUPATIONAL THERAPY NOTE
Pt off of floor for stress test - will re-attempt OT evaluation when patient returns and is appropriate

## 2017-10-27 NOTE — PROGRESS NOTES
Pulmonary Progress Note     Assessment / Plan:  1. Dyspnea / hypoxia - DDx includes pneumonitis related to chemo vs volume overload vs AECOPD (less likely). Anemia is likely contributing as well. V/Q very low prob. LE duplex is limited by no gross e/o DVT.

## 2017-10-28 RX ORDER — IPRATROPIUM BROMIDE AND ALBUTEROL SULFATE 2.5; .5 MG/3ML; MG/3ML
3 SOLUTION RESPIRATORY (INHALATION)
Status: DISCONTINUED | OUTPATIENT
Start: 2017-10-28 | End: 2017-10-31

## 2017-10-28 NOTE — PROGRESS NOTES
[facility] AND [facility] Infectious Disease Progress Note    Purvi Zavala Patient Status:  Inpatient    1935 MRN [id number]   Location [facility] 4W/SW/SE Attending Neha Olmstead MD   Hosp Day # 2 PCP Shruti Angela MD     Subjective:  Pt stat Nebulization, Q4H PRN  •  Heparin Sodium (Porcine) 5000 UNIT/ML injection 5,000 Units, 5,000 Units, Subcutaneous, Q8H    Physical Exam:  General: Alert, orientated x3. Cooperative. No apparent distress.   Vital Signs:  Blood pressure 138/51, pulse 86, tem IV cefepime, add azithromycin  2. Immunocompromised  -hx of CLL  -last chemo on 10/3  3. DMII  -strict glycemic control  4.   Hx of AFB+  -cultures with mycobaterium fortuitum  -was seen by us in 2/2017 but she did not follow up    If you have any questio

## 2017-10-28 NOTE — CM/SW NOTE
Therapy recommendation is for home health services. [facility], as she has used them in the past. Referral made to [facility].  Will need orders to confirm the referral prior to dc.    INNA nayak [id number]

## 2017-10-28 NOTE — PROGRESS NOTES
[address] [place] [address] Patient Status:  Inpatient    1935 MRN [id number]   Location [facility] 4W/SW/SE Attending Rachel Banda MD   Hosp Day # 2 PCP Constantine Edmond MD     SUBJECTIVE: Pt continues to have significant SOB with minima 1-5 Units, 1-5 Units, Subcutaneous, TID CC  •  ipratropium-albuterol (DUONEB) nebulizer solution 3 mL, 3 mL, Nebulization, Q4H PRN  •  Heparin Sodium (Porcine) 5000 UNIT/ML injection 5,000 Units, 5,000 Units, Subcutaneous, Q8H      Physical Exam:           hilar lymph nodes. Additional prominent but nonenlarged left greater than right axillary lymph nodes. Constellation of findings are considered indeterminate in the setting of   lymphoma.  Although these could simply be reactive to infection, lymphomatous in per ID  -if fevers persist in this immunocompromised patient and infectious workup remains unrevealing, could consider bronchoscopy with BAL for lower lung sampling to rule out infection   2. COPD  - Anoro  - bd protocol  3.  Fever - RVP negative  - antibio

## 2017-10-28 NOTE — PROGRESS NOTES
Assessment and Plan:     1. Dyspnea  - uncertain etiology  2. CLL        - chemo last 21/2 weeks ago         - Fevers neutropenic in past   3. HTN  4. DM  5.  HLD      PLAN:  - Echo  EF normal mild AS   - Lexiscan normal 10/27  - BP acceptable no change i suspected infectious/inflammatory pneumonitis. Atypical and/or viral pneumonia certainly within the differential. Drug reaction also  possible but considered less likely. 2. Mild emphysema.  3. Mild to moderate mediastinal lymphadenopathy with probable enla

## 2017-10-28 NOTE — PHYSICAL THERAPY NOTE
PHYSICAL THERAPY EVALUATION - INPATIENT     Room Number: 464/464-A  Evaluation Date: 10/28/2017  Type of Evaluation: Initial  Physician Order: PT Eval and Treat    Presenting Problem: SOB  Reason for Therapy: Mobility Dysfunction and Discharge Planning diabetes mellitus without mention of complication, not stated as uncontrolled    • Unspecified essential hypertension        Past Surgical History  Past Surgical History:  No date: APPENDECTOMY  1986: BENIGN BIOPSY LEFT  1985: BENIGN BIOPSY RIGHT  No date: does the patient currently have. ..  -   Turning over in bed (including adjusting bedclothes, sheets and blankets)?: A Little   -   Sitting down on and standing up from a chair with arms (e.g., wheelchair, bedside commode, etc.): A Little   -   Moving from home activity/exercise instructions provided to patient in preparation for discharge.    Goal #5   Current Status    Goal #6    Goal #6  Current Status

## 2017-10-28 NOTE — PROGRESS NOTES
[facility] AND CLINICS  Progress Note    Cyrus Hernandez Patient Status:  Inpatient    1935 MRN [id number]   Location [facility] 4W/SW/SE Attending Elmyra Nageotte, MD   Hosp Day # 2 PCP Mary Jo Caro MD     Subjective:  Continues to feel poor.  Had an

## 2017-10-28 NOTE — PROGRESS NOTES
[facility] Hospitalist Progress Note     CC: Hospital Follow up    PCP: Jerry Castellanos MD       Assessment/Plan:     Principal Problem:    Neutropenic fever ([facility]  Active Problems:    Symptomatic anemia    Dyspnea, unspecified type    Leukopenia, unspecified typ -Troponin negative x1, EKG without acute ischemia  -CXR negative for acute process, RVP negative  -V/Q low prob for PE  -US legs-negative DVT  -  -echo with EF %, no WMA, PAP 42, CVP 3.  Lexiscan negative for ischemia   -S/P 2 units PRBC's, h 137/40      Intake/Output:    Intake/Output Summary (Last 24 hours) at 10/28/17 1432  Last data filed at 10/28/17 1325   Gross per 24 hour   Intake              540 ml   Output             1450 ml   Net             -910 ml       Last 3 Weights  10/26/17 02 mediastinal lymphadenopathy with probable enlarged hilar lymph nodes. Additional prominent but nonenlarged left greater than right axillary lymph nodes. Constellation of findings are considered indeterminate in the setting of lymphoma.  Although these could Intravenous Q24H   • vancomycin  125 mg Oral BID   • escitalopram  20 mg Oral Daily   • insulin detemir  15 Units Subcutaneous Nightly   • Levothyroxine Sodium  50 mcg Oral Before breakfast   • Pantoprazole Sodium  40 mg Oral Daily   • atorvastatin  10 mg

## 2017-10-29 RX ORDER — CIPROFLOXACIN 2 MG/ML
400 INJECTION, SOLUTION INTRAVENOUS EVERY 12 HOURS
Status: DISCONTINUED | OUTPATIENT
Start: 2017-10-29 | End: 2017-10-31

## 2017-10-29 RX ORDER — MAGNESIUM OXIDE 400 MG (241.3 MG MAGNESIUM) TABLET
800 TABLET ONCE
Status: COMPLETED | OUTPATIENT
Start: 2017-10-29 | End: 2017-10-29

## 2017-10-29 RX ORDER — SULFAMETHOXAZOLE AND TRIMETHOPRIM 800; 160 MG/1; MG/1
1 TABLET ORAL EVERY 12 HOURS SCHEDULED
Status: DISCONTINUED | OUTPATIENT
Start: 2017-10-29 | End: 2017-11-02

## 2017-10-29 RX ORDER — LOPERAMIDE HYDROCHLORIDE 2 MG/1
2 CAPSULE ORAL 3 TIMES DAILY PRN
Status: DISCONTINUED | OUTPATIENT
Start: 2017-10-29 | End: 2017-11-02

## 2017-10-29 RX ORDER — SODIUM CHLORIDE 9 MG/ML
INJECTION, SOLUTION INTRAVENOUS
Status: COMPLETED
Start: 2017-10-29 | End: 2017-10-29

## 2017-10-29 NOTE — PROGRESS NOTES
[facility] Hospitalist Progress Note     CC: Hospital Follow up    PCP: Zachery Odonnell MD       Assessment/Plan:     Principal Problem:    Neutropenic fever ([facility]  Active Problems:    Symptomatic anemia    Dyspnea, unspecified type    Leukopenia, unspecified typ negative x1, EKG without acute ischemia  -CXR negative for acute process, RVP negative  -V/Q low prob for PE  -US legs-negative DVT  -  -echo with EF %, no WMA, PAP 42, CVP 3.  Lexiscan negative for ischemia   -S/P 2 units PRBC's, hgb now 8.3 141/47      Intake/Output:    Intake/Output Summary (Last 24 hours) at 10/29/17 1057  Last data filed at 10/29/17 0845   Gross per 24 hour   Intake             2738 ml   Output              850 ml   Net             1888 ml       Last 3 Weights  10/26/17 02 nodes. Additional prominent but nonenlarged left greater than right axillary lymph nodes. Constellation of findings are considered indeterminate in the setting of lymphoma.  Although these could simply be reactive to infection, lymphomatous involvement is d Loperamide HCl, Albuterol Sulfate HFA, Normal Saline Flush, acetaminophen, ondansetron HCl, dextrose 50%, Glucose-Vitamin C

## 2017-10-29 NOTE — PROGRESS NOTES
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 For now, d/c cefepime and azithromycin and will try IV ciprofloxacin and p.o. Bactrim together. If she tolerates this, we can attempt a discharge on p.o. Rx. Will need to continue p.o.  Vancomyci

## 2017-10-29 NOTE — CM/SW NOTE
[facility] orders obtained and provided to [facility] voicemail.     INNA nayak [id number]

## 2017-10-29 NOTE — PLAN OF CARE
HEMATOLOGIC - ADULT    • Maintains hematologic stability Progressing        Patient Centered Care    • Patient preferences are identified and integrated in the patient's plan of care Progressing        Patient/Family Goals    • Patient/Family [address] [facility]

## 2017-10-29 NOTE — OCCUPATIONAL THERAPY NOTE
OCCUPATIONAL THERAPY EVALUATION - INPATIENT     Room Number: 464/464-A  Evaluation Date: 10/29/2017  Type of Evaluation: Initial  Presenting Problem:  (Neutropenic fever; SOB)    Physician Order: IP Consult to Occupational Therapy  Reason for Therapy: ADL/ disease) ([facility]    • Depression    • Esophageal reflux    • HTN (hypertension)    • Hyperkalemia    • Hypothyroid    • Mitral regurgitation 2007    Mild to moderate   • Other and unspecified hyperlipidemia    • Pulmonary emphysema (HCC)    • Renal lithiasis oriented x4    VISION  Current Vision: wears glasses all the time  Acuity:  able to read clock/calendar on wall without difficulty    SENSATION  Reports no BUE numbness or tingling    Communication: Makes needs known    Behavioral/Emotional/Social: Pt was modified independent. Comment:     Patient will complete toileting with modified independent. Comment:     Patient will tolerate standing for 4 minutes in prep for adls with modified independent.     Comment:                  Goals  on: 2017

## 2017-10-29 NOTE — PROGRESS NOTES
Pulmonary Progress Note        NAME: Ari [facility] - ROOM: [facility]/[address] - MRN: [id number] - Age: 80year old - : 1935    Past Medical History:   Diagnosis Date   • Anemia    • CKD (chronic kidney disease), stage III    • CLL (chronic lymphocytic leuk Location: Left arm)   Pulse 100   Temp 99.1 °F (37.3 °C) (Oral)   Resp 20   Ht 5' 4\" (1.626 m)   Wt 257 lb 11.2 oz (116.9 kg)   SpO2 95%   BMI 44.23 kg/m²   General:  Alert, no distress   Lungs:   Clear to auscultation bilaterally.    Heart:   S1, S2 tang

## 2017-10-29 NOTE — PROGRESS NOTES
Assessment and Plan:     1. Dyspnea  - uncertain etiology  2. CLL        - chemo last 21/2 weeks ago   3.       - Fevers neutropenic   4. HTN  5. DM  6.  HLD      PLAN:  - Echo:  EF normal mild AS   - Lexiscan normal 10/27  - BP acceptable no change in Rx clinically suspected infectious/inflammatory pneumonitis. Atypical and/or viral pneumonia certainly within the differential. Drug reaction also  possible but considered less likely. 2. Mild emphysema.  3. Mild to moderate mediastinal lymphadenopathy with pr

## 2017-10-30 PROBLEM — Z71.89 GOALS OF CARE, COUNSELING/DISCUSSION: Status: ACTIVE | Noted: 2017-10-30

## 2017-10-30 PROBLEM — Z71.89 ADVANCE CARE PLANNING: Status: ACTIVE | Noted: 2017-10-30

## 2017-10-30 PROCEDURE — 99222 1ST HOSP IP/OBS MODERATE 55: CPT | Performed by: INTERNAL MEDICINE

## 2017-10-30 RX ORDER — MAGNESIUM OXIDE 400 MG (241.3 MG MAGNESIUM) TABLET
800 TABLET ONCE
Status: COMPLETED | OUTPATIENT
Start: 2017-10-30 | End: 2017-10-30

## 2017-10-30 NOTE — CONSULTS
Pikk [address] Patient Status:  Inpatient    1935 MRN [id number]   Location [facility] 4W/SW/SE Attending Simona Santoyo MD   Hosp Day # 4 PCP Zachery Odonnell MD     Date of remains in her chair, she is symptom free. She was very upset just earlier since she experienced severe chills. She made it clear to her other physicians that she wants her code status changed to DNR CODE STATUS which was ordered in EPIC.     Patient lives hospitalization if needed. POLST form was completed with these expressed wishes and original copy given to Rimma Woodson and Wendy Chen and a copy made for chart. Dr Arely Franco joined our [address] discussion later on and we confirmed her wishes as above.     I also intro home  Does Patient Live Alone: no  Does Patient have Legal Guardian: no  Is Patient Confused: no    Substance History:  Smoking Status: according to H and P, she smoked 1 ppd for 38 years and quit in Jan of 1987.   Hx of Substance Use/Abuse: yes in the past 5,000 Units, Subcutaneous, Q8H    No current outpatient prescriptions on file. Review of Systems:  Pertinent items are noted in HPI.       Hematology:  Lab Results  Component Value Date   WBC 1.2 (L) 10/30/2017   HGB 7.7 (L) 10/30/2017   HCT 23.0 (L) 10/ assess    Disposition: home palliative care    Patient and family are agreeable to community palliative care services to visit home    Assessment/Recommendations:    Neutropenic fever (HCC)      Symptomatic anemia      Dyspnea, unspecified type      Leukop

## 2017-10-30 NOTE — PROGRESS NOTES
[facility] AND [facility] Infectious Disease  Progress Note    Angela Proctor Patient Status:  Inpatient    1935 MRN [id number]   Location [facility] 4W/SW/SE Attending Jeromy Reyes MD   Hosp Day # 4 PCP Sharon Koenig MD     Subjective: Atypical and/or viral pneumonia certainly within the differential.      Antibiotics Reviewed:  Cipro/bactrim day #2    Assessment and Plan:     1.  Fevers in a neutropenic host - now concerning for possible flourishing of previously noted mycobacterium for suppression throughout as she is at higher risk for relapse of her c.diff because of these antibiotics. Palliative care to also see the patient to discuss goals of care.     Lidia Nash  [facility] Infectious Disease  [phone]    10/30/2017

## 2017-10-30 NOTE — PLAN OF CARE
HEMATOLOGIC - ADULT    • Maintains hematologic stability Progressing        Patient Centered Care    • Patient preferences are identified and integrated in the patient's plan of care Progressing        Patient/Family Goals    • Patient/Family [facility] [facility]

## 2017-10-30 NOTE — PROGRESS NOTES
Pulmonary Progress Note     Assessment / Plan:  1. Dyspnea / hypoxia - negative PE and DVT work up.  CT chest with diffuse GGO predominantly in a peribronchovascular distribution with some mild-mod mediastinal LAD, which was also evident on CT chest in 9/28

## 2017-10-30 NOTE — PROGRESS NOTES
[facility] Hospitalist Team  Progress Note    Deep Valdez Patient Status:  Inpatient    1935 MRN [id number]   Location [facility] 4W/SW/SE Attending Precious Russo MD   Hosp Day # 4 PCP Tosha Serra MD     CC: Follow Up  PCP: Kolby Browning in noted in January 2019 but not treated, now on cipro/bactrim ID. Will need close renal monitoring and tx for 6-12 months.  Hx C diff on po vanco for prophylaxis  -oncology and ID eval     Acute on Chronic SOB with Chronic Respiratory Failure on Home O2  norvasc and losartan.  Now on coreg  - resume BB     Hypothyroidism  - continue home meds     HL  -statin held with elevated LFT's     Depression/Anxiety  -continue home meds    GOC  -requests DNR  -palliative care monserrat oh in am  -diet-ADA    135*  193*       No results for input(s): TROP in the last 72 hours.         MEDICATIONS        Current Facility-Administered Medications:  magnesium oxide (MAG-OX) tab 800 mg 800 mg Oral Once   Loperamide HCl (IMODIUM) cap 2 mg 2 mg Oral TID PRN   Ciproflo likely. 2. Mild emphysema. 3. Mild to moderate mediastinal lymphadenopathy with probable enlarged hilar lymph nodes. Additional prominent but nonenlarged left greater than right axillary lymph nodes.  Constellation of findings are considered indeterminate i murmurs   ABD: Soft, non-tender, non-distended, +BS  Neuro: Grossly normal, CN intact, sensory intact  Psych: Affect- normal  SKIN: warm, dry  EXT: no edema    Assessment/Plan    Patient is a 80year old female with PMH sig for COPD on O2 at night, with CK needed continous O2 at 2L   -Troponin negative x1, EKG without acute ischemia  -CXR negative for acute process, RVP negative  -V/Q low prob for PE  -US legs-negative DVT  -  -echo with EF %, no WMA, PAP 42, CVP 3.  Lexiscan negative for ischemi

## 2017-10-30 NOTE — PHYSICAL THERAPY NOTE
Chart reviewed   RN approve participation as pt is willing     Pt received up in room    Pt daughter is present     Pt declines any mobility or exercises with PT at this time    Pt states not feeling well  \" I am so tired and I just stop tremoring  , I ca

## 2017-10-30 NOTE — PROGRESS NOTES
Assessment and Plan:     IMPRESSION:    1. 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 Chronic lymphocytic leukemia, status post chemotherapy for the past couple of weeks, with some anemia and thrombocytopenia.     3.       Kwaku Nesbitt hours. Recent Labs   Lab  10/30/17   0541   RBC  2.72*   HGB  7.7*   HCT  23.0*   MCV  84.7   MCH  28.1   MCHC  33.2   RDW  15.2*   WBC  1.2*   PLT  95*     No results for input(s): INR in the last 72 hours. ECHO:  1. Left ventricle:  The ca

## 2017-10-31 PROCEDURE — 99231 SBSQ HOSP IP/OBS SF/LOW 25: CPT | Performed by: INTERNAL MEDICINE

## 2017-10-31 RX ORDER — MELATONIN
325 2 TIMES DAILY WITH MEALS
Status: DISCONTINUED | OUTPATIENT
Start: 2017-10-31 | End: 2017-11-02

## 2017-10-31 RX ORDER — ALPRAZOLAM 0.25 MG/1
0.25 TABLET ORAL 3 TIMES DAILY PRN
Status: DISCONTINUED | OUTPATIENT
Start: 2017-10-31 | End: 2017-11-02

## 2017-10-31 RX ORDER — IPRATROPIUM BROMIDE AND ALBUTEROL SULFATE 2.5; .5 MG/3ML; MG/3ML
3 SOLUTION RESPIRATORY (INHALATION) EVERY 4 HOURS PRN
Status: DISCONTINUED | OUTPATIENT
Start: 2017-10-31 | End: 2017-11-02

## 2017-10-31 RX ORDER — CIPROFLOXACIN 2 MG/ML
400 INJECTION, SOLUTION INTRAVENOUS EVERY 24 HOURS
Status: DISCONTINUED | OUTPATIENT
Start: 2017-11-01 | End: 2017-11-02

## 2017-10-31 RX ORDER — MAGNESIUM OXIDE 400 MG (241.3 MG MAGNESIUM) TABLET
800 TABLET ONCE
Status: COMPLETED | OUTPATIENT
Start: 2017-10-31 | End: 2017-10-31

## 2017-10-31 NOTE — PLAN OF CARE
HEMATOLOGIC - ADULT    • Maintains hematologic stability Progressing        Patient Centered Care    • Patient preferences are identified and integrated in the patient's plan of care Progressing        Patient/Family Goals    • Patient/Family [facility]

## 2017-10-31 NOTE — PROGRESS NOTES
[facility] AND [facility] Infectious Disease  Progress Note    Candy Person Patient Status:  Inpatient    1935 MRN [id number]   Location [facility] 4W/SW/SE Attending Melany Jones MD   Hosp Day # 5 PCP Cindy Tran MD     Subjective: pneumonia certainly within the differential.      Antibiotics Reviewed:  Cipro/bactrim day #2     Assessment and Plan:     1.  Fevers in a neutropenic host - now concerning for possible flourishing of previously noted mycobacterium fortuitum noted in [place] throughout as she is at higher risk for relapse of her c.diff because of these antibiotics. Possible d/c home as soon as tomorrow with this plan. >35min spent at patient's bedside today in examination and coordination of today's plan of care. Ldiia Dalton

## 2017-10-31 NOTE — PROGRESS NOTES
[facility] Pharmacy Note:  Renal Adjustment for Cipro (ciprofloxacin)    Cyrus Hernandez is a 80year old female who has been prescribed Cipro (ciprofloxacin) 400 mg every 12 hrs. CrCl is estimated creatinine clearance is 25 mL/min (based on SCr of 1.5 mg/dL).  so

## 2017-10-31 NOTE — PROGRESS NOTES
[facility] Hospitalist Team  Progress Note    Karlie Stevenson Patient Status:  Inpatient    1935 MRN [id number]   Location [facility] 4W/SW/SE Attending Patrica Shaffer MD   Hosp Day # 5 PCP Onur Christie MD     CC: Follow Up  PCP: Kim Catalan afebrile  -as per oncology     Acute on Chronic SOB with Chronic Respiratory Failure on Home O2   -per pt she has been using O2 at night at 2L but since starting chemo has needed continous O2 at 2L.    - RVP negative, V/Q low prob for PE, US legs-negative D Neha Sandhu MD    Concerns regarding plan of care were discussed with patient. Patient agrees with plan as detailed above.  Discussed plan of care with Dr. Hilario Munoz RN, NP   [facility] Hospitalist Team  Pager [phone]   Answering Service n mL 3 mL Nebulization Q4H WA (5 times daily)   vancomycin (FIRST-VANCOMYCIN 50) 50 MG/ML oral solution 125 mg 125 mg Oral BID   Albuterol Sulfate  (90 Base) MCG/ACT inhaler 2 puff 2 puff Inhalation Q4H PRN   escitalopram (LEXAPRO) tablet 20 mg 20 mg SVT, HTN, HLD,depression, Hypothyroidism,  CLL on chemo and recent admission 10/13/17 for hyperkalemia and STACIA on CKD attributed to cell lysis from chemo worsened with diuretics/ACE and dehydration who now presents with fever and SOB with recent blood tx f using 2L continuous at home  -as per pulmonary and pulmonary     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

## 2017-10-31 NOTE — PHYSICAL THERAPY NOTE
PHYSICAL THERAPY TREATMENT NOTE - INPATIENT    Room Number: 464/464-A       Presenting Problem: SOB    Problem List  Principal Problem:    Neutropenic fever ([facility] [address]  Active Problems:    Symptomatic anemia    Dyspnea, unspecified type    Leukopenia, unspecif weakness    AM-PAC '6-Clicks' INPATIENT SHORT FORM - BASIC MOBILITY  How much difficulty does the patient currently have. ..  -   Turning over in bed (including adjusting bedclothes, sheets and blankets)?: A Little   -   Sitting down on and standing up from Goal #4   Current Status NT   Goal #5 Patient to demonstrate independence with home activity/exercise instructions provided to patient in preparation for discharge.    Goal #5   Current Status In progress   Goal #6    Goal #6  Current Status

## 2017-10-31 NOTE — PLAN OF CARE
HEMATOLOGIC - ADULT    • Maintains hematologic stability Not Progressing        RISK FOR INFECTION - ADULT    • Absence of fever/infection during anticipated neutropenic period Not Progressing          Patient Centered Care    • Patient preferences are renetta

## 2017-10-31 NOTE — CONSULTS
[facility]  Palliative Care Follow Up    Imelda Rodarte Patient Status:  Inpatient    1935 MRN [id number]   Location [facility] 4W/SW/SE Attending Juliette Del Rosario MD   Hosp Day # 5 PCP Kimberly Tompkins MD     Date of [facility] 2 times per day  •  ipratropium-albuterol (DUONEB) nebulizer solution 3 mL, 3 mL, Nebulization, Q4H WA (5 times daily)  •  vancomycin (FIRST-VANCOMYCIN 50) 50 MG/ML oral solution 125 mg, 125 mg, Oral, BID  •  Albuterol Sulfate  (90 Base) MCG/ACT inh 01/03/2014   MG 1.4 (L) 10/31/2017   PHOS 8.3 (H) 10/13/2017   TROP 0.00 10/25/2017       Imaging:        Objective:  Vital Signs:  Blood pressure 119/48, pulse 95, temperature 99 °F (37.2 °C), temperature source Oral, resp.  rate 20, height 5' 4\" (1.626 m history taking, physical examination, and >50% was spent counseling and coordinating care. DNR CODE STATUS    HCPSHEEBA is her eldest daughter Kari Proctor form completed and signed. [place] took the original home.  Copies made and in chart and with our serv

## 2017-10-31 NOTE — PROGRESS NOTES
Pulmonary Progress Note      NAME: Cathy Lambert - ROOM: [address] [place] [address] - MRN: [id number] - Age: 80year old - : 1935    Assessment/Plan:  1. Dyspnea / hypoxia - negative PE and DVT work up.  CT chest with diffuse GGO predominantly in a peribronchovascu (116.9 kg)   SpO2 95%   BMI 44.23 kg/m²   Physical Exam:   General: alert, cooperative, no respiratory distress. HEENT: Normocephalic atraumatic. Lips, mucosa, and tongue normal.  No thrush noted.    Lungs: diminished bilaterally   Chest wall: No tendernes

## 2017-10-31 NOTE — CM/SW NOTE
10/31-MD orders order received in regards to outpatient palliative care services. This Writer informed Jelly ([id number]) [facility]  palliative care orders.     -CHRISSIE JASSO, [facility] [id number]

## 2017-10-31 NOTE — PROGRESS NOTES
Hematology/Oncology  Progress Note        NAME: Benny Stokes - ROOM: [facility]- - MRN: [id number] - Age: 80year old - : 1935    Subjective:  No acute events overnight.  Feels okay, afebrile this am.     Medications:  • Ciprofloxacin in D5W  400 mg No thrush noted. Lungs: + rhonchi bilaterally. Heart: Regular rate and rhythm, normal S1S2, no murmur. Abdomen: soft, non-tender, non-distended, positive BS. Extremity: no edema   Skin: No rashes or lesions.     Recent Labs   Lab  10/31/17   1386 concerns.     [facility]   Hematology and Oncology  [address] [place] [address]  [phone]

## 2017-11-01 ENCOUNTER — APPOINTMENT (OUTPATIENT)
Dept: ULTRASOUND IMAGING | Facility: HOSPITAL | Age: 82
DRG: 178 | End: 2017-11-01
Attending: NURSE PRACTITIONER
Payer: MEDICARE

## 2017-11-01 PROCEDURE — 76705 ECHO EXAM OF ABDOMEN: CPT | Performed by: NURSE PRACTITIONER

## 2017-11-01 RX ORDER — MAGNESIUM OXIDE 400 MG (241.3 MG MAGNESIUM) TABLET
400 TABLET ONCE
Status: COMPLETED | OUTPATIENT
Start: 2017-11-01 | End: 2017-11-01

## 2017-11-01 RX ORDER — FLUTICASONE PROPIONATE 50 MCG
1 SPRAY, SUSPENSION (ML) NASAL DAILY
Status: DISCONTINUED | OUTPATIENT
Start: 2017-11-01 | End: 2017-11-02

## 2017-11-01 NOTE — PHYSICAL THERAPY NOTE
Attempted to see pt this PM, refused stated that she is tired wanted to sleep and has been seen by OT earlier. Stated that she has bee walking in the room several times. Nursing made aware.

## 2017-11-01 NOTE — PROGRESS NOTES
[facility] Hospitalist Team  Progress Note    Quynh Mar Patient Status:  Inpatient    1935 MRN [id number]   Location [facility] 4W/SW/SE Attending Alma Rosa Perez MD   Hosp Day # 6 PCP Deo Overton MD     CC: Follow Up  PCP: Mekhi Doll secondary to chemotherapy vs bactrim effect  -ANC 1000, low grade temps noted-tmax 100.4  -as per oncology     Acute on Chronic SOB with Chronic Respiratory Failure on Home O2   -per pt she has been using O2 at night at 2L but since starting chemo has need losartan.  Now on coreg  -follow BP     Hypothyroidism  -TSH 2.11  - continue home meds     HL  -statin held with elevated LFT's     Depression/Anxiety  -continue home meds    GOC  -DNR  -palliative care at D/C     NICOLE oh in am  -diet-ADA     Prophy Current Facility-Administered Medications:  ALPRAZolam (XANAX) tab 0.25 mg 0.25 mg Oral TID PRN   ipratropium-albuterol (DUONEB) nebulizer solution 3 mL 3 mL Nebulization Q4H PRN   insulin detemir (LEVEMIR) 100 UNIT/ML flextouch 20 Units 20 Units Subcu oz (116.9 kg)   SpO2 93%   BMI 44.23 kg/m²     Exam:  GEN: obese elderly female in NAD  HEENT: EOMI, PERRLA  Neck: Supple   Pulm: CTAB, diminished at bases  CV: RRR, no murmurs   ABD: Soft, non-tender, non-distended, +BS  Neuro: Grossly normal, CN intact, 1/2017 with EF 80-89%, diastolic dysfunction, PAP 36, rec stress test but appears pt did not get  -per pt she has been using O2 at night at 2L but since starting chemo has needed continous O2 at 2L   -Troponin negative x1, EKG without acute ischemia  -CXR

## 2017-11-01 NOTE — PLAN OF CARE
HEMATOLOGIC - ADULT    • Maintains hematologic stability Progressing        Patient Centered Care    • Patient preferences are identified and integrated in the patient's plan of care Progressing        Patient/Family Goals    • Patient/Family [address] [facility]

## 2017-11-01 NOTE — OCCUPATIONAL THERAPY NOTE
ONasal cannulaCCUPATIONAL THERAPY TREATMENT NOTE - INPATIENT     Room Number: 464/464-A          Presenting Problem: neutropenic fever, SOB    Problem List  Principal Problem:    Neutropenic fever ([address]  Active Problems:    Symptomatic anemia    Dyspnea, u alarm    WEIGHT BEARING RESTRICTION  Weight Bearing Restriction: None                PAIN ASSESSMENT  Ratin           ACTIVITY TOLERANCE  O2 Saturation at rest 95% and with activity 75%  O2 Device: Nasal cannula  Liters of O2:  3.5L  Shortness of breat 11/11/2017  Frequency: 3x/week

## 2017-11-01 NOTE — PROGRESS NOTES
[facility] AND [facility] Infectious Disease  Progress Note    Lin Gonzalez Patient Status:  Inpatient    1935 MRN [id number]   Location [facility] 4W/SW/SE Attending Keyonna Del Cid MD   Hosp Day # 6 PCP Naina Don MD     Subjective: within the differential.      Antibiotics Reviewed:  Cipro/bactrim day #4     Assessment and Plan:     1.  Fevers in a neutropenic host - now concerning for possible flourishing of previously noted mycobacterium fortuitum noted in January 2019 but not jose manuel because of these antibiotics. d/c anticipated with this plan when patient is fever-free. >35min spent at patient's bedside today in examination and coordination of today's plan of care.       Lidia Connelly [facility] Infectious Disease  [phone]

## 2017-11-01 NOTE — PROGRESS NOTES
Hematology/Oncology  Progress Note        NAME: Luana Gottlieb - ROOM: 355/540-Y - MRN: [id number] - Age: 80year old - : 1935    Subjective:  No acute events overnight. Low grade temperature at 100.1  Feels well otherwise.  On O2, but breathing fee atraumatic. Throat: Lips, mucosa, and tongue normal.  No thrush noted. Lungs: clear to ascultation bilaterally    Heart: Regular rate and rhythm, normal S1S2, no murmur. Abdomen: soft, non-tender, non-distended, positive BS.    Extremity: no edema   S fortuitum from January 2017, likely reactivated due to immunesupression from chemotherapy and or CLL   - ID and pulm on board, appreciate recommendation  - abx per ID      Neutropenia  - secondary to chemotherapy vs bactrim effect  - continue to monitor

## 2017-11-02 VITALS
TEMPERATURE: 98 F | BODY MASS INDEX: 43.99 KG/M2 | RESPIRATION RATE: 18 BRPM | DIASTOLIC BLOOD PRESSURE: 51 MMHG | WEIGHT: 257.69 LBS | HEIGHT: 64 IN | SYSTOLIC BLOOD PRESSURE: 131 MMHG | HEART RATE: 88 BPM | OXYGEN SATURATION: 97 %

## 2017-11-02 PROBLEM — Z71.89 ADVANCE CARE PLANNING: Status: RESOLVED | Noted: 2017-10-30 | Resolved: 2017-11-02

## 2017-11-02 RX ORDER — MOXIFLOXACIN HYDROCHLORIDE 400 MG/1
400 TABLET ORAL DAILY
Qty: 28 TABLET | Refills: 0 | Status: SHIPPED | OUTPATIENT
Start: 2017-11-02 | End: 2017-11-30

## 2017-11-02 RX ORDER — MONTELUKAST SODIUM 10 MG/1
10 TABLET ORAL NIGHTLY
Qty: 30 TABLET | Refills: 0 | Status: SHIPPED | OUTPATIENT
Start: 2017-11-02 | End: 2017-12-01

## 2017-11-02 RX ORDER — MONTELUKAST SODIUM 10 MG/1
10 TABLET ORAL NIGHTLY
Status: DISCONTINUED | OUTPATIENT
Start: 2017-11-02 | End: 2017-11-02

## 2017-11-02 RX ORDER — SULFAMETHOXAZOLE AND TRIMETHOPRIM 800; 160 MG/1; MG/1
1 TABLET ORAL 2 TIMES DAILY
Qty: 56 TABLET | Refills: 0 | Status: SHIPPED | OUTPATIENT
Start: 2017-11-02 | End: 2017-11-30

## 2017-11-02 RX ORDER — MELATONIN
325 2 TIMES DAILY WITH MEALS
Qty: 60 TABLET | Refills: 0 | Status: SHIPPED | OUTPATIENT
Start: 2017-11-02 | End: 2017-12-14

## 2017-11-02 RX ORDER — CARVEDILOL 3.12 MG/1
3.12 TABLET ORAL 2 TIMES DAILY WITH MEALS
Status: DISCONTINUED | OUTPATIENT
Start: 2017-11-02 | End: 2017-11-02

## 2017-11-02 RX ORDER — MAGNESIUM OXIDE 400 MG (241.3 MG MAGNESIUM) TABLET
400 TABLET ONCE
Status: COMPLETED | OUTPATIENT
Start: 2017-11-02 | End: 2017-11-02

## 2017-11-02 NOTE — PROGRESS NOTES
Pulmonary Progress Note      NAME: Gladys Pendleton - ROOM: [address] [place] [address]-A - MRN: [id number] - Age: 80year old - : 1935    Assessment/Plan:  1. Dyspnea / hypoxia - negative PE and DVT work up.  CT chest with diffuse GGO predominantly in a peribronchovascu 4\" (1.626 m)   Wt 257 lb 11.2 oz (116.9 kg)   SpO2 93%   BMI 44.23 kg/m²   Physical Exam:   General: alert, cooperative, no respiratory distress. HEENT: Normocephalic atraumatic. Lips, mucosa, and tongue normal.  No thrush noted.    Lungs: right midlung c

## 2017-11-02 NOTE — PROGRESS NOTES
[facility] Infectious Disease Progress Note    Mona Fonseca Patient Status:  Inpatient    1935 MRN [id number]   Location [facility] 4W/SW/SE Attending Simona Santoyo MD   Hosp Day # 7 PCP Zachery Odonnell MD     Subjective: injection 4 mg, 4 mg, Intravenous, Q6H PRN  •  dextrose 50% injection 50 mL, 50 mL, Intravenous, PRN  •  Glucose-Vitamin C (DEX-4) 4-0.006 g chewable tab 4 tablet, 4 tablet, Oral, Q15 Min PRN  •  Insulin Aspart Pen (NOVOLOG) 100 UNIT/ML flexpen 1-5 Units, 11/02/2017   MG 1.7 11/02/2017       Radiology:  Reviewed     Assessment/Plan:    1.  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   - Recently in the hospital 10/19 for the same - UA, CXR, blood Cx, RVP ne MD YOLANDA  11/2/2017  3:04 PM

## 2017-11-02 NOTE — PLAN OF CARE
HEMATOLOGIC - ADULT    • Maintains hematologic stability Progressing        Patient Centered Care    • Patient preferences are identified and integrated in the patient's plan of care Progressing        Patient/Family Goals    • Patient/Family [address] [facility]

## 2017-11-02 NOTE — DISCHARGE SUMMARY
[facility] Hospitalist Discharge Summary   Patient ID:  Mark Manzo  [id number]  55 year old  4/19/1935    Admit date: 10/25/2017  Discharge date: 11/2/2017  Risk of Readmission Lace+ Score: 75  59-90 High Risk  29-58 Medium Risk  0-28   Low Risk    Primary Ca with diuretics/ACE and dehydration who now presents with fever and SOB with recent blood tx for symptomatic anemia.  Infectious work up underway for neutropenic fever, pt felt to have PNA due to mycobacterium, on bactrim and cipro with avelox at D/C with cl ischemia.   -S/P 2 units PRBC's, hgb now 7.7.  Not felt to be related to anemia  -currently on 4L   -follow up as outpt     Elevated LFT\"s  -new since October  -recent CT A/P negative, US with hepatic lesion, noted GB without hepatobiliary dilation CTA  CARDIO: Regular, no murmur  ABD: soft, NT, ND, BS+  EXTREMITIES: no edema, no calf tenderness, SCD in place    Operative Procedures:   Radiology:         Discharge Instructions     Medication List      START taking these medications    ferrous sulfate tablet (8 mg total) by mouth every 8 (eight) hours as needed for Nausea. Pantoprazole Sodium 40 MG Tbec  Commonly known as:  PROTONIX  Take 1 tablet (40 mg total) by mouth daily.      Prochlorperazine Maleate 10 mg tablet  Commonly known as:  COMPAZINE up  Contact information:  [address]             Jono Mario DO. Schedule an appointment as soon as possible for a visit in 1 week.     Specialties:  INFECTIOUS DISEASES, Internal Medicine  Why: plans for tx 12 months if tolerated. Cards eval done with SOB which was negative ischemic eval negative. Palliative care consulted, pt  DNR, with palliative care at D/C. Pt D/C to home. Rest as above.     Karyle Motes, MD  [facility] hospitalist

## 2017-11-02 NOTE — PROGRESS NOTES
[facility] Hospitalist Team  Progress Note    Talib Najera Patient Status:  Inpatient    1935 MRN [id number]   Location [facility] 4W/SW/SE Attending An Arnold MD   Hosp Day # 7 PCP Crystal Kenendy MD     CC: Follow Up  PCP: Sue Roa yesterday, tmax 100.8. Per oncology ok to d/C  -as per ID      Neutropenia   - secondary to chemotherapy vs bactrim effect  -ANC 1300, low grade temps noted-tmax 100.8 noted yesterday  -follow CBC, per oncology not limiting factor for d/c.  Will need outpt sliding scale.  Thinks she was also taking oral meds but not sure what they are   -stop all oral meds,  levemir increased to  20 units at night with sliding scale, adjust as needed  -accuchecks  -ADA diet     HTN/SVT  -has been off lasix, norvasc and losart 10/31/17   0635  11/01/17   0552  11/02/17   0606   ALT  145*  126*  122*   AST  106*  84*  84*   ALB  2.5*  2.6*  2.5*       Recent Labs   Lab  11/01/17   1144  11/01/17   1749  11/01/17   2120  11/02/17   0810  11/02/17   1152   PGLU  167*  159*  295*  1 Sodium (Porcine) 5000 UNIT/ML injection 5,000 Units 5,000 Units Subcutaneous Q8H         IMAGING             SEE ATTENDING NOTE BELOW:     Patient examined and assessed independently. Agree with above APN assessment.      S: Feels better.  No new complaints negative, CXR negative, RVP negative, stool panel negative  -rectal C diff neg  - CT chest with Ground glass opacities in bilateral lung fields  -Blood Cx NGTD  -CMV, EBV, HSV pending  -cefepime/ azithro added  -oncology and ID eval     Acute on Chronic SO

## 2017-11-02 NOTE — PLAN OF CARE
HEMATOLOGIC - ADULT    • Maintains hematologic stability Adequate for Discharge        Patient Centered Care    • Patient preferences are identified and integrated in the patient's plan of care Adequate for Discharge        Patient/Family Goals    • Patien

## 2017-11-02 NOTE — PROGRESS NOTES
Hematology/Oncology  Progress Note        NAME: Imelda Rodarte - ROOM: [address] [place] [address] - MRN: [id number] - Age: 80year old - : 1935    Subjective:  No acute events overnight.  Feels well     Medications:  • Fluticasone Propionate  1 spray Each Nare Daily tongue normal.  No thrush noted. Lungs: clear to ascultation bilaterally    Heart: Regular rate and rhythm, normal S1S2, no murmur. Abdomen: soft, non-tender, non-distended, positive BS. Extremity: no edema   Skin: No rashes or lesions.     Component 2.5 (L)   Bilirubin, Direct      0.0 - 0.2 mg/dL 0.1       Assessment/Plan  Ms. Sammie Aguero is an 80year old woman with a history of obesity, COPD and CLL admitted with JAIN and NF after her first cycle of chemotherapy with bendamustine and rituximab.     Rajani Cardozo

## 2017-11-03 ENCOUNTER — TELEPHONE (OUTPATIENT)
Dept: MEDSURG UNIT | Facility: HOSPITAL | Age: 82
End: 2017-11-03

## 2017-11-07 ENCOUNTER — LAB REQUISITION (OUTPATIENT)
Dept: LAB | Facility: HOSPITAL | Age: 82
End: 2017-11-07
Payer: MEDICARE

## 2017-11-07 DIAGNOSIS — D70.9 NEUTROPENIA (HCC): ICD-10-CM

## 2017-11-07 PROCEDURE — 85025 COMPLETE CBC W/AUTO DIFF WBC: CPT

## 2017-11-07 PROCEDURE — 80048 BASIC METABOLIC PNL TOTAL CA: CPT

## 2017-12-07 PROBLEM — D70.9 NEUTROPENIC FEVER (HCC): Status: RESOLVED | Noted: 2017-10-26 | Resolved: 2017-12-07

## 2017-12-07 PROBLEM — N17.9 ACUTE RENAL FAILURE SUPERIMPOSED ON CHRONIC KIDNEY DISEASE, UNSPECIFIED CKD STAGE, UNSPECIFIED ACUTE RENAL FAILURE TYPE: Status: RESOLVED | Noted: 2017-10-13 | Resolved: 2017-12-07

## 2017-12-07 PROBLEM — J43.8 OTHER EMPHYSEMA (HCC): Status: ACTIVE | Noted: 2017-12-07

## 2017-12-07 PROBLEM — N18.9 ACUTE RENAL FAILURE SUPERIMPOSED ON CHRONIC KIDNEY DISEASE, UNSPECIFIED CKD STAGE, UNSPECIFIED ACUTE RENAL FAILURE TYPE: Status: RESOLVED | Noted: 2017-10-13 | Resolved: 2017-12-07

## 2017-12-07 PROBLEM — A31.8: Status: ACTIVE | Noted: 2017-12-07

## 2017-12-07 PROBLEM — J18.9 COMMUNITY ACQUIRED PNEUMONIA: Status: RESOLVED | Noted: 2017-01-09 | Resolved: 2017-12-07

## 2017-12-07 PROBLEM — R50.81 NEUTROPENIC FEVER: Status: RESOLVED | Noted: 2017-10-26 | Resolved: 2017-12-07

## 2017-12-07 PROBLEM — N18.9 ACUTE RENAL FAILURE SUPERIMPOSED ON CHRONIC KIDNEY DISEASE, UNSPECIFIED CKD STAGE, UNSPECIFIED ACUTE RENAL FAILURE TYPE (HCC): Status: RESOLVED | Noted: 2017-10-13 | Resolved: 2017-12-07

## 2017-12-07 PROBLEM — E86.0 DEHYDRATION: Status: RESOLVED | Noted: 2017-10-13 | Resolved: 2017-12-07

## 2017-12-07 PROBLEM — Z71.89 GOALS OF CARE, COUNSELING/DISCUSSION: Status: RESOLVED | Noted: 2017-10-30 | Resolved: 2017-12-07

## 2017-12-07 PROBLEM — N17.9 AKI (ACUTE KIDNEY INJURY) (HCC): Status: RESOLVED | Noted: 2017-10-13 | Resolved: 2017-12-07

## 2017-12-07 PROBLEM — N17.9 ACUTE RENAL FAILURE SUPERIMPOSED ON CHRONIC KIDNEY DISEASE, UNSPECIFIED CKD STAGE, UNSPECIFIED ACUTE RENAL FAILURE TYPE (HCC): Status: RESOLVED | Noted: 2017-10-13 | Resolved: 2017-12-07

## 2017-12-07 PROBLEM — D70.9 NEUTROPENIC FEVER: Status: RESOLVED | Noted: 2017-10-26 | Resolved: 2017-12-07

## 2017-12-07 PROBLEM — D64.9 ANEMIA: Status: RESOLVED | Noted: 2017-10-24 | Resolved: 2017-12-07

## 2017-12-07 PROBLEM — D64.9 SYMPTOMATIC ANEMIA: Status: RESOLVED | Noted: 2017-10-26 | Resolved: 2017-12-07

## 2017-12-07 PROBLEM — E87.5 HYPERKALEMIA: Status: RESOLVED | Noted: 2017-10-13 | Resolved: 2017-12-07

## 2017-12-07 PROBLEM — R50.81 NEUTROPENIC FEVER (HCC): Status: RESOLVED | Noted: 2017-10-26 | Resolved: 2017-12-07

## 2017-12-07 PROBLEM — R06.00 DYSPNEA, UNSPECIFIED TYPE: Status: RESOLVED | Noted: 2017-10-26 | Resolved: 2017-12-07

## 2017-12-07 PROBLEM — D72.819 LEUKOPENIA, UNSPECIFIED TYPE: Status: RESOLVED | Noted: 2017-10-26 | Resolved: 2017-12-07

## 2017-12-07 PROCEDURE — 81001 URINALYSIS AUTO W/SCOPE: CPT | Performed by: FAMILY MEDICINE

## 2017-12-07 PROCEDURE — 87086 URINE CULTURE/COLONY COUNT: CPT | Performed by: FAMILY MEDICINE

## 2018-02-16 ENCOUNTER — APPOINTMENT (OUTPATIENT)
Dept: GENERAL RADIOLOGY | Facility: HOSPITAL | Age: 83
DRG: 682 | End: 2018-02-16
Attending: EMERGENCY MEDICINE
Payer: MEDICARE

## 2018-02-16 ENCOUNTER — HOSPITAL ENCOUNTER (INPATIENT)
Facility: HOSPITAL | Age: 83
LOS: 4 days | Discharge: HOME HEALTH | DRG: 682 | End: 2018-02-20
Attending: EMERGENCY MEDICINE | Admitting: INTERNAL MEDICINE
Payer: MEDICARE

## 2018-02-16 ENCOUNTER — LAB REQUISITION (OUTPATIENT)
Dept: LAB | Facility: HOSPITAL | Age: 83
DRG: 682 | End: 2018-02-16
Payer: MEDICARE

## 2018-02-16 DIAGNOSIS — N18.30 CONTROLLED TYPE 2 DIABETES MELLITUS WITH STAGE 3 CHRONIC KIDNEY DISEASE, WITHOUT LONG-TERM CURRENT USE OF INSULIN (HCC): ICD-10-CM

## 2018-02-16 DIAGNOSIS — D63.0 ANEMIA IN NEOPLASTIC DISEASE: ICD-10-CM

## 2018-02-16 DIAGNOSIS — E11.22 CONTROLLED TYPE 2 DIABETES MELLITUS WITH STAGE 3 CHRONIC KIDNEY DISEASE, WITHOUT LONG-TERM CURRENT USE OF INSULIN (HCC): ICD-10-CM

## 2018-02-16 DIAGNOSIS — N17.9 AKI (ACUTE KIDNEY INJURY) (HCC): Primary | ICD-10-CM

## 2018-02-16 DIAGNOSIS — E87.5 HYPERKALEMIA: ICD-10-CM

## 2018-02-16 DIAGNOSIS — E11.22 TYPE 2 DIABETES MELLITUS WITH DIABETIC CHRONIC KIDNEY DISEASE (HCC): ICD-10-CM

## 2018-02-16 DIAGNOSIS — N18.30 CHRONIC KIDNEY DISEASE, STAGE III (MODERATE) (HCC): ICD-10-CM

## 2018-02-16 LAB
ANION GAP SERPL CALC-SCNC: 9 MMOL/L (ref 0–18)
ANION GAP SERPL CALC-SCNC: 9 MMOL/L (ref 0–18)
BACTERIA UR QL AUTO: NEGATIVE /HPF
BASOPHILS # BLD: 0 K/UL (ref 0–0.2)
BASOPHILS # BLD: 0 K/UL (ref 0–0.2)
BASOPHILS NFR BLD: 0 %
BASOPHILS NFR BLD: 1 %
BILIRUB UR QL: NEGATIVE
BUN SERPL-MCNC: 40 MG/DL (ref 8–20)
BUN SERPL-MCNC: 45 MG/DL (ref 8–20)
BUN/CREAT SERPL: 15.2 (ref 10–20)
BUN/CREAT SERPL: 17 (ref 10–20)
CALCIUM SERPL-MCNC: 9 MG/DL (ref 8.5–10.5)
CALCIUM SERPL-MCNC: 9.3 MG/DL (ref 8.5–10.5)
CHLORIDE SERPL-SCNC: 107 MMOL/L (ref 95–110)
CHLORIDE SERPL-SCNC: 109 MMOL/L (ref 95–110)
CLARITY UR: CLEAR
CO2 SERPL-SCNC: 18 MMOL/L (ref 22–32)
CO2 SERPL-SCNC: 20 MMOL/L (ref 22–32)
COLOR UR: YELLOW
CREAT SERPL-MCNC: 2.63 MG/DL (ref 0.5–1.5)
CREAT SERPL-MCNC: 2.65 MG/DL (ref 0.5–1.5)
EOSINOPHIL # BLD: 0.1 K/UL (ref 0–0.7)
EOSINOPHIL # BLD: 0.1 K/UL (ref 0–0.7)
EOSINOPHIL NFR BLD: 2 %
EOSINOPHIL NFR BLD: 2 %
ERYTHROCYTE [DISTWIDTH] IN BLOOD BY AUTOMATED COUNT: 13.8 % (ref 11–15)
ERYTHROCYTE [DISTWIDTH] IN BLOOD BY AUTOMATED COUNT: 13.9 % (ref 11–15)
GLUCOSE BLDC GLUCOMTR-MCNC: 87 MG/DL (ref 70–99)
GLUCOSE SERPL-MCNC: 135 MG/DL (ref 70–99)
GLUCOSE SERPL-MCNC: 138 MG/DL (ref 70–99)
GLUCOSE UR-MCNC: NEGATIVE MG/DL
HCT VFR BLD AUTO: 28.5 % (ref 35–48)
HCT VFR BLD AUTO: 30.4 % (ref 35–48)
HGB BLD-MCNC: 10.2 G/DL (ref 12–16)
HGB BLD-MCNC: 9.4 G/DL (ref 12–16)
HGB UR QL STRIP.AUTO: NEGATIVE
KETONES UR-MCNC: NEGATIVE MG/DL
LEUKOCYTE ESTERASE UR QL STRIP.AUTO: NEGATIVE
LYMPHOCYTES # BLD: 1.9 K/UL (ref 1–4)
LYMPHOCYTES # BLD: 2.2 K/UL (ref 1–4)
LYMPHOCYTES NFR BLD: 39 %
LYMPHOCYTES NFR BLD: 40 %
MCH RBC QN AUTO: 28.5 PG (ref 27–32)
MCH RBC QN AUTO: 28.6 PG (ref 27–32)
MCHC RBC AUTO-ENTMCNC: 33 G/DL (ref 32–37)
MCHC RBC AUTO-ENTMCNC: 33.4 G/DL (ref 32–37)
MCV RBC AUTO: 85.6 FL (ref 80–100)
MCV RBC AUTO: 86.2 FL (ref 80–100)
MONOCYTES # BLD: 0.3 K/UL (ref 0–1)
MONOCYTES # BLD: 0.3 K/UL (ref 0–1)
MONOCYTES NFR BLD: 6 %
MONOCYTES NFR BLD: 6 %
NEUTROPHILS # BLD AUTO: 2.5 K/UL (ref 1.8–7.7)
NEUTROPHILS # BLD AUTO: 2.9 K/UL (ref 1.8–7.7)
NEUTROPHILS NFR BLD: 51 %
NEUTROPHILS NFR BLD: 53 %
NITRITE UR QL STRIP.AUTO: NEGATIVE
OSMOLALITY UR CALC.SUM OF ELEC: 294 MOSM/KG (ref 275–295)
OSMOLALITY UR CALC.SUM OF ELEC: 296 MOSM/KG (ref 275–295)
PH UR: 5 [PH] (ref 5–8)
PLATELET # BLD AUTO: 147 K/UL (ref 140–400)
PLATELET # BLD AUTO: 150 K/UL (ref 140–400)
PMV BLD AUTO: 8 FL (ref 7.4–10.3)
PMV BLD AUTO: 8.2 FL (ref 7.4–10.3)
POTASSIUM SERPL-SCNC: 6.4 MMOL/L (ref 3.3–5.1)
POTASSIUM SERPL-SCNC: 6.5 MMOL/L (ref 3.3–5.1)
PROT UR-MCNC: NEGATIVE MG/DL
RBC # BLD AUTO: 3.3 M/UL (ref 3.7–5.4)
RBC # BLD AUTO: 3.55 M/UL (ref 3.7–5.4)
RBC #/AREA URNS AUTO: 3 /HPF
SODIUM SERPL-SCNC: 136 MMOL/L (ref 136–144)
SODIUM SERPL-SCNC: 136 MMOL/L (ref 136–144)
SP GR UR STRIP: 1.01 (ref 1–1.03)
UROBILINOGEN UR STRIP-ACNC: <2
VIT C UR-MCNC: 20 MG/DL
WBC # BLD AUTO: 4.8 K/UL (ref 4–11)
WBC # BLD AUTO: 5.5 K/UL (ref 4–11)
WBC #/AREA URNS AUTO: 4 /HPF

## 2018-02-16 PROCEDURE — 99285 EMERGENCY DEPT VISIT HI MDM: CPT

## 2018-02-16 PROCEDURE — 93010 ELECTROCARDIOGRAM REPORT: CPT | Performed by: EMERGENCY MEDICINE

## 2018-02-16 PROCEDURE — 94640 AIRWAY INHALATION TREATMENT: CPT

## 2018-02-16 PROCEDURE — 96361 HYDRATE IV INFUSION ADD-ON: CPT

## 2018-02-16 PROCEDURE — 71045 X-RAY EXAM CHEST 1 VIEW: CPT | Performed by: EMERGENCY MEDICINE

## 2018-02-16 PROCEDURE — 85025 COMPLETE CBC W/AUTO DIFF WBC: CPT | Performed by: FAMILY MEDICINE

## 2018-02-16 PROCEDURE — 96375 TX/PRO/DX INJ NEW DRUG ADDON: CPT

## 2018-02-16 PROCEDURE — 93005 ELECTROCARDIOGRAM TRACING: CPT

## 2018-02-16 PROCEDURE — 80048 BASIC METABOLIC PNL TOTAL CA: CPT | Performed by: EMERGENCY MEDICINE

## 2018-02-16 PROCEDURE — 80048 BASIC METABOLIC PNL TOTAL CA: CPT | Performed by: FAMILY MEDICINE

## 2018-02-16 PROCEDURE — 81003 URINALYSIS AUTO W/O SCOPE: CPT | Performed by: EMERGENCY MEDICINE

## 2018-02-16 PROCEDURE — 82962 GLUCOSE BLOOD TEST: CPT

## 2018-02-16 PROCEDURE — 96374 THER/PROPH/DIAG INJ IV PUSH: CPT

## 2018-02-16 PROCEDURE — 85025 COMPLETE CBC W/AUTO DIFF WBC: CPT | Performed by: EMERGENCY MEDICINE

## 2018-02-16 PROCEDURE — 83036 HEMOGLOBIN GLYCOSYLATED A1C: CPT | Performed by: FAMILY MEDICINE

## 2018-02-16 RX ORDER — ACETAMINOPHEN 325 MG/1
650 TABLET ORAL EVERY 6 HOURS PRN
Status: DISCONTINUED | OUTPATIENT
Start: 2018-02-16 | End: 2018-02-20

## 2018-02-16 RX ORDER — ALBUTEROL SULFATE 2.5 MG/3ML
2.5 SOLUTION RESPIRATORY (INHALATION) ONCE
Status: COMPLETED | OUTPATIENT
Start: 2018-02-16 | End: 2018-02-16

## 2018-02-16 RX ORDER — CIPROFLOXACIN 500 MG/1
250 TABLET, FILM COATED ORAL 2 TIMES DAILY
Status: DISCONTINUED | OUTPATIENT
Start: 2018-02-17 | End: 2018-02-19

## 2018-02-16 RX ORDER — DOCUSATE SODIUM 100 MG/1
100 CAPSULE, LIQUID FILLED ORAL 2 TIMES DAILY
Status: DISCONTINUED | OUTPATIENT
Start: 2018-02-17 | End: 2018-02-20

## 2018-02-16 RX ORDER — ESCITALOPRAM OXALATE 10 MG/1
20 TABLET ORAL
Status: DISCONTINUED | OUTPATIENT
Start: 2018-02-17 | End: 2018-02-20

## 2018-02-16 RX ORDER — DEXTROSE MONOHYDRATE 25 G/50ML
50 INJECTION, SOLUTION INTRAVENOUS AS NEEDED
Status: DISCONTINUED | OUTPATIENT
Start: 2018-02-16 | End: 2018-02-20

## 2018-02-16 RX ORDER — SODIUM CHLORIDE 9 MG/ML
INJECTION, SOLUTION INTRAVENOUS CONTINUOUS
Status: DISCONTINUED | OUTPATIENT
Start: 2018-02-17 | End: 2018-02-20

## 2018-02-16 RX ORDER — SODIUM CHLORIDE 0.9 % (FLUSH) 0.9 %
3 SYRINGE (ML) INJECTION AS NEEDED
Status: DISCONTINUED | OUTPATIENT
Start: 2018-02-16 | End: 2018-02-20

## 2018-02-16 RX ORDER — BISACODYL 10 MG
10 SUPPOSITORY, RECTAL RECTAL
Status: DISCONTINUED | OUTPATIENT
Start: 2018-02-16 | End: 2018-02-20

## 2018-02-16 RX ORDER — ONDANSETRON 2 MG/ML
4 INJECTION INTRAMUSCULAR; INTRAVENOUS EVERY 6 HOURS PRN
Status: DISCONTINUED | OUTPATIENT
Start: 2018-02-16 | End: 2018-02-20

## 2018-02-16 RX ORDER — POLYETHYLENE GLYCOL 3350 17 G/17G
17 POWDER, FOR SOLUTION ORAL DAILY PRN
Status: DISCONTINUED | OUTPATIENT
Start: 2018-02-16 | End: 2018-02-20

## 2018-02-16 RX ORDER — LEVOTHYROXINE SODIUM 0.05 MG/1
50 TABLET ORAL
Status: DISCONTINUED | OUTPATIENT
Start: 2018-02-17 | End: 2018-02-20

## 2018-02-16 RX ORDER — MONTELUKAST SODIUM 10 MG/1
10 TABLET ORAL NIGHTLY
Status: DISCONTINUED | OUTPATIENT
Start: 2018-02-17 | End: 2018-02-20

## 2018-02-16 RX ORDER — DEXTROSE MONOHYDRATE 25 G/50ML
50 INJECTION, SOLUTION INTRAVENOUS ONCE
Status: COMPLETED | OUTPATIENT
Start: 2018-02-16 | End: 2018-02-16

## 2018-02-16 RX ORDER — AMLODIPINE BESYLATE 5 MG/1
5 TABLET ORAL DAILY
Status: DISCONTINUED | OUTPATIENT
Start: 2018-02-17 | End: 2018-02-20

## 2018-02-16 RX ORDER — PANTOPRAZOLE SODIUM 40 MG/1
40 TABLET, DELAYED RELEASE ORAL DAILY
Status: DISCONTINUED | OUTPATIENT
Start: 2018-02-17 | End: 2018-02-20

## 2018-02-16 RX ORDER — CARVEDILOL 3.12 MG/1
3.12 TABLET ORAL 2 TIMES DAILY WITH MEALS
Status: DISCONTINUED | OUTPATIENT
Start: 2018-02-17 | End: 2018-02-20

## 2018-02-16 RX ORDER — SODIUM CHLORIDE, SODIUM LACTATE, POTASSIUM CHLORIDE, CALCIUM CHLORIDE 600; 310; 30; 20 MG/100ML; MG/100ML; MG/100ML; MG/100ML
INJECTION, SOLUTION INTRAVENOUS CONTINUOUS
Status: DISCONTINUED | OUTPATIENT
Start: 2018-02-16 | End: 2018-02-17

## 2018-02-17 LAB
ANION GAP SERPL CALC-SCNC: 5 MMOL/L (ref 0–18)
ANION GAP SERPL CALC-SCNC: 7 MMOL/L (ref 0–18)
ANION GAP SERPL CALC-SCNC: 8 MMOL/L (ref 0–18)
BASOPHILS # BLD: 0 K/UL (ref 0–0.2)
BASOPHILS NFR BLD: 1 %
BUN SERPL-MCNC: 35 MG/DL (ref 8–20)
BUN SERPL-MCNC: 40 MG/DL (ref 8–20)
BUN SERPL-MCNC: 43 MG/DL (ref 8–20)
BUN/CREAT SERPL: 14.9 (ref 10–20)
BUN/CREAT SERPL: 16.3 (ref 10–20)
BUN/CREAT SERPL: 16.9 (ref 10–20)
CALCIUM SERPL-MCNC: 8.4 MG/DL (ref 8.5–10.5)
CALCIUM SERPL-MCNC: 8.7 MG/DL (ref 8.5–10.5)
CALCIUM SERPL-MCNC: 8.9 MG/DL (ref 8.5–10.5)
CHLORIDE SERPL-SCNC: 110 MMOL/L (ref 95–110)
CHLORIDE SERPL-SCNC: 110 MMOL/L (ref 95–110)
CHLORIDE SERPL-SCNC: 113 MMOL/L (ref 95–110)
CO2 SERPL-SCNC: 20 MMOL/L (ref 22–32)
CO2 SERPL-SCNC: 22 MMOL/L (ref 22–32)
CO2 SERPL-SCNC: 22 MMOL/L (ref 22–32)
CREAT SERPL-MCNC: 2.35 MG/DL (ref 0.5–1.5)
CREAT SERPL-MCNC: 2.46 MG/DL (ref 0.5–1.5)
CREAT SERPL-MCNC: 2.55 MG/DL (ref 0.5–1.5)
CREAT UR-MCNC: 40.6 MG/DL
EOSINOPHIL # BLD: 0.1 K/UL (ref 0–0.7)
EOSINOPHIL NFR BLD: 2 %
ERYTHROCYTE [DISTWIDTH] IN BLOOD BY AUTOMATED COUNT: 13.8 % (ref 11–15)
GLUCOSE BLDC GLUCOMTR-MCNC: 102 MG/DL (ref 70–99)
GLUCOSE BLDC GLUCOMTR-MCNC: 110 MG/DL (ref 70–99)
GLUCOSE BLDC GLUCOMTR-MCNC: 119 MG/DL (ref 70–99)
GLUCOSE BLDC GLUCOMTR-MCNC: 172 MG/DL (ref 70–99)
GLUCOSE SERPL-MCNC: 111 MG/DL (ref 70–99)
GLUCOSE SERPL-MCNC: 131 MG/DL (ref 70–99)
GLUCOSE SERPL-MCNC: 147 MG/DL (ref 70–99)
HBA1C MFR BLD: 5.8 % (ref 4–6)
HBA1C MFR BLD: 5.9 % (ref 4–6)
HCT VFR BLD AUTO: 25.9 % (ref 35–48)
HGB BLD-MCNC: 8.6 G/DL (ref 12–16)
LYMPHOCYTES # BLD: 1.6 K/UL (ref 1–4)
LYMPHOCYTES NFR BLD: 39 %
MAGNESIUM SERPL-MCNC: 1.6 MG/DL (ref 1.8–2.5)
MCH RBC QN AUTO: 28.6 PG (ref 27–32)
MCHC RBC AUTO-ENTMCNC: 33.1 G/DL (ref 32–37)
MCV RBC AUTO: 86.3 FL (ref 80–100)
MONOCYTES # BLD: 0.4 K/UL (ref 0–1)
MONOCYTES NFR BLD: 9 %
NEUTROPHILS # BLD AUTO: 1.9 K/UL (ref 1.8–7.7)
NEUTROPHILS NFR BLD: 48 %
OSMOLALITY UR CALC.SUM OF ELEC: 298 MOSM/KG (ref 275–295)
OSMOLALITY UR CALC.SUM OF ELEC: 300 MOSM/KG (ref 275–295)
OSMOLALITY UR CALC.SUM OF ELEC: 301 MOSM/KG (ref 275–295)
PLATELET # BLD AUTO: 128 K/UL (ref 140–400)
PMV BLD AUTO: 8.1 FL (ref 7.4–10.3)
POTASSIUM SERPL-SCNC: 5.6 MMOL/L (ref 3.3–5.1)
POTASSIUM SERPL-SCNC: 5.8 MMOL/L (ref 3.3–5.1)
POTASSIUM SERPL-SCNC: 5.9 MMOL/L (ref 3.3–5.1)
RBC # BLD AUTO: 3 M/UL (ref 3.7–5.4)
SODIUM SERPL-SCNC: 138 MMOL/L (ref 136–144)
SODIUM SERPL-SCNC: 139 MMOL/L (ref 136–144)
SODIUM SERPL-SCNC: 140 MMOL/L (ref 136–144)
SODIUM UR-SCNC: 160 MMOL/L
WBC # BLD AUTO: 4 K/UL (ref 4–11)

## 2018-02-17 PROCEDURE — 84300 ASSAY OF URINE SODIUM: CPT | Performed by: INTERNAL MEDICINE

## 2018-02-17 PROCEDURE — 82570 ASSAY OF URINE CREATININE: CPT | Performed by: INTERNAL MEDICINE

## 2018-02-17 PROCEDURE — 85025 COMPLETE CBC W/AUTO DIFF WBC: CPT | Performed by: INTERNAL MEDICINE

## 2018-02-17 PROCEDURE — 82962 GLUCOSE BLOOD TEST: CPT

## 2018-02-17 PROCEDURE — 80048 BASIC METABOLIC PNL TOTAL CA: CPT | Performed by: INTERNAL MEDICINE

## 2018-02-17 PROCEDURE — 83735 ASSAY OF MAGNESIUM: CPT | Performed by: INTERNAL MEDICINE

## 2018-02-17 PROCEDURE — 80048 BASIC METABOLIC PNL TOTAL CA: CPT | Performed by: HOSPITALIST

## 2018-02-17 PROCEDURE — 83036 HEMOGLOBIN GLYCOSYLATED A1C: CPT | Performed by: INTERNAL MEDICINE

## 2018-02-17 RX ORDER — MAGNESIUM OXIDE 400 MG (241.3 MG MAGNESIUM) TABLET
400 TABLET ONCE
Status: COMPLETED | OUTPATIENT
Start: 2018-02-17 | End: 2018-02-17

## 2018-02-17 RX ORDER — SODIUM BICARBONATE 650 MG/1
650 TABLET ORAL 2 TIMES DAILY
Status: DISCONTINUED | OUTPATIENT
Start: 2018-02-17 | End: 2018-02-20

## 2018-02-17 RX ORDER — SODIUM POLYSTYRENE SULFONATE 15 G/60ML
15 SUSPENSION ORAL; RECTAL ONCE
Status: COMPLETED | OUTPATIENT
Start: 2018-02-17 | End: 2018-02-17

## 2018-02-17 RX ORDER — HEPARIN SODIUM 5000 [USP'U]/ML
5000 INJECTION, SOLUTION INTRAVENOUS; SUBCUTANEOUS EVERY 8 HOURS SCHEDULED
Status: DISCONTINUED | OUTPATIENT
Start: 2018-02-17 | End: 2018-02-19

## 2018-02-17 NOTE — H&P
[facility] Hospitalist H&P     CC: Patient presents with:  Dyspnea ALEC SOB (respiratory)       PCP: Rachel Dempsey MD      Assessment and Plan     Maikel Johnson is a 80year old female with PMH sig for COPD, CKD stage 3 with baseline around 1.3-1.5 with trend old female with PMH sig for COPD, CKD stage 3 with baseline around 1.3-1.5 with trend up in 12/2018, GERD, HTN.  Hypothyroidism, Mild ot moderate MR, SVT, type 2 DM on insulin, chronic respiratory failure with intermittent home O2 due to mycobacterium fortu SKIN BENIG 1.1-2CM TRUNK,ARM,LEG      Comment: Procedure: PVSKMWJL OF LEG MASS;  Surgeon:                Ely Venegas;   Location: [address] [place] [address],[address]     ALL:    Novocain [Procaine *    Bradyarrhythmia    Comment:Per patient high hea Packs/day  For 38.00 Years     Start date: 2/1/1949    Quit date: 1/7/1987    Smokeless tobacco: Never Used    Alcohol use No        Fam Hx  Family History   Problem Relation Age of Onset   • Heart Disorder Father      cva   • Heart Disorder Mother    • [place] artifacts obscure fine detail. CARDIAC/VASC: The cardiomediastinal silhouette is mildly enlarged. There is mild tortuosity of the thoracic aorta with peripheral atherosclerotic vascular calcification. The pulmonary vascularity is within normal limits.  MEDI

## 2018-02-17 NOTE — ED PROVIDER NOTES
Patient Seen in: [facility] AND [facility] Emergency Department    History   Patient presents with:  Dyspnea ALEC SOB (respiratory)      HPI     Patient presents to the ED after having routine labs done at her house by visiting nurse and then being called by her Oral Solution TAKE 2.5ML BY MOUTH 2 TIMES DAILY Disp: 150 mL Rfl: 3 Past Week at Unknown time   FERROUS SULFATE 325 (65 FE) MG Oral Tab EC TAKE ONE TABLET BY MOUTH TWICE DAILY WITH MEALS.  Disp: 60 tablet Rfl: 2 2/15/2018 at Unknown time   CIPROFLOXACIN HCL Strip Use as directed Disp: 200 strip Rfl: 3    Vancomycin HCl 125 MG Oral Cap Take 1 capsule (125 mg total) by mouth 2 (two) times daily.  Disp: 60 capsule Rfl: 2 not taking   Prochlorperazine Maleate (COMPAZINE) 10 mg tablet Take 1 tablet (10 mg total) by 86  Resp: 20  Temp: 98 °F (36.7 °C)  Temp src: Temporal  SpO2: 97 %  O2 Device: None (Room air)    Physical Exam   Constitutional: She appears well-developed and well-nourished. No distress. HENT:   Head: Normocephalic and atraumatic.    Eyes: Conjunctiva Abnormal            Final result                 Please view results for these tests on the individual orders.    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 to contribute to the complexity of this ED evaluation.     ED Course: Patient presents to the ED

## 2018-02-17 NOTE — CM/SW NOTE
SW was notified that pt is [facility] w[facility]. HHC orders are in to resume.     No Dougherty, [address]

## 2018-02-17 NOTE — PLAN OF CARE
Problem: METABOLIC/FLUID AND ELECTROLYTES - ADULT  Goal: Glucose maintained within prescribed range  INTERVENTIONS:  - Monitor Blood Glucose as ordered  - Assess for signs and symptoms of hyperglycemia and hypoglycemia  - Administer ordered medications to perspectives and choices  Outcome: Progressing      Problem: Patient/Family Goals  Goal: Patient/Family Long Term Goal  Patient's Long Term Goal: to go home     Interventions:  - monitor lab values  -monitor vitals  -follow md orders    - See additional Ca

## 2018-02-17 NOTE — PLAN OF CARE
METABOLIC/FLUID AND ELECTROLYTES - ADULT    • Hemodynamic stability and optimal renal function maintained- Nephrology on consult to see patient Not Progressing          METABOLIC/FLUID AND ELECTROLYTES - ADULT    • Glucose maintained within prescribed rang

## 2018-02-18 ENCOUNTER — APPOINTMENT (OUTPATIENT)
Dept: ULTRASOUND IMAGING | Facility: HOSPITAL | Age: 83
DRG: 682 | End: 2018-02-18
Attending: INTERNAL MEDICINE
Payer: MEDICARE

## 2018-02-18 LAB
ANION GAP SERPL CALC-SCNC: 3 MMOL/L (ref 0–18)
ANION GAP SERPL CALC-SCNC: 5 MMOL/L (ref 0–18)
ANTIBODY SCREEN: NEGATIVE
BASOPHILS # BLD: 0 K/UL (ref 0–0.2)
BASOPHILS NFR BLD: 1 %
BUN SERPL-MCNC: 32 MG/DL (ref 8–20)
BUN SERPL-MCNC: 34 MG/DL (ref 8–20)
BUN/CREAT SERPL: 14.4 (ref 10–20)
BUN/CREAT SERPL: 14.7 (ref 10–20)
CALCIUM SERPL-MCNC: 8.3 MG/DL (ref 8.5–10.5)
CALCIUM SERPL-MCNC: 8.4 MG/DL (ref 8.5–10.5)
CHLORIDE SERPL-SCNC: 112 MMOL/L (ref 95–110)
CHLORIDE SERPL-SCNC: 113 MMOL/L (ref 95–110)
CO2 SERPL-SCNC: 23 MMOL/L (ref 22–32)
CO2 SERPL-SCNC: 23 MMOL/L (ref 22–32)
CREAT SERPL-MCNC: 2.17 MG/DL (ref 0.5–1.5)
CREAT SERPL-MCNC: 2.36 MG/DL (ref 0.5–1.5)
EOSINOPHIL # BLD: 0.1 K/UL (ref 0–0.7)
EOSINOPHIL NFR BLD: 3 %
ERYTHROCYTE [DISTWIDTH] IN BLOOD BY AUTOMATED COUNT: 13.8 % (ref 11–15)
GLUCOSE BLDC GLUCOMTR-MCNC: 115 MG/DL (ref 70–99)
GLUCOSE BLDC GLUCOMTR-MCNC: 118 MG/DL (ref 70–99)
GLUCOSE BLDC GLUCOMTR-MCNC: 161 MG/DL (ref 70–99)
GLUCOSE BLDC GLUCOMTR-MCNC: 93 MG/DL (ref 70–99)
GLUCOSE SERPL-MCNC: 101 MG/DL (ref 70–99)
GLUCOSE SERPL-MCNC: 130 MG/DL (ref 70–99)
HCT VFR BLD AUTO: 26.2 % (ref 35–48)
HGB BLD-MCNC: 8.7 G/DL (ref 12–16)
LYMPHOCYTES # BLD: 1.6 K/UL (ref 1–4)
LYMPHOCYTES NFR BLD: 42 %
MAGNESIUM SERPL-MCNC: 1.5 MG/DL (ref 1.8–2.5)
MCH RBC QN AUTO: 28.8 PG (ref 27–32)
MCHC RBC AUTO-ENTMCNC: 33.2 G/DL (ref 32–37)
MCV RBC AUTO: 86.7 FL (ref 80–100)
MONOCYTES # BLD: 0.3 K/UL (ref 0–1)
MONOCYTES NFR BLD: 8 %
NEUTROPHILS # BLD AUTO: 1.8 K/UL (ref 1.8–7.7)
NEUTROPHILS NFR BLD: 47 %
OSMOLALITY UR CALC.SUM OF ELEC: 296 MOSM/KG (ref 275–295)
OSMOLALITY UR CALC.SUM OF ELEC: 299 MOSM/KG (ref 275–295)
PLATELET # BLD AUTO: 120 K/UL (ref 140–400)
PMV BLD AUTO: 7.6 FL (ref 7.4–10.3)
POTASSIUM SERPL-SCNC: 5.1 MMOL/L (ref 3.3–5.1)
POTASSIUM SERPL-SCNC: 5.6 MMOL/L (ref 3.3–5.1)
RBC # BLD AUTO: 3.02 M/UL (ref 3.7–5.4)
RH BLOOD TYPE: POSITIVE
SODIUM SERPL-SCNC: 139 MMOL/L (ref 136–144)
SODIUM SERPL-SCNC: 140 MMOL/L (ref 136–144)
WBC # BLD AUTO: 3.8 K/UL (ref 4–11)

## 2018-02-18 PROCEDURE — 80048 BASIC METABOLIC PNL TOTAL CA: CPT | Performed by: HOSPITALIST

## 2018-02-18 PROCEDURE — 85025 COMPLETE CBC W/AUTO DIFF WBC: CPT | Performed by: HOSPITALIST

## 2018-02-18 PROCEDURE — 80048 BASIC METABOLIC PNL TOTAL CA: CPT | Performed by: INTERNAL MEDICINE

## 2018-02-18 PROCEDURE — 86901 BLOOD TYPING SEROLOGIC RH(D): CPT | Performed by: HOSPITALIST

## 2018-02-18 PROCEDURE — 86900 BLOOD TYPING SEROLOGIC ABO: CPT | Performed by: HOSPITALIST

## 2018-02-18 PROCEDURE — 86850 RBC ANTIBODY SCREEN: CPT | Performed by: HOSPITALIST

## 2018-02-18 PROCEDURE — 76770 US EXAM ABDO BACK WALL COMP: CPT | Performed by: INTERNAL MEDICINE

## 2018-02-18 PROCEDURE — 82962 GLUCOSE BLOOD TEST: CPT

## 2018-02-18 PROCEDURE — 83735 ASSAY OF MAGNESIUM: CPT | Performed by: HOSPITALIST

## 2018-02-18 RX ORDER — MAGNESIUM OXIDE 400 MG (241.3 MG MAGNESIUM) TABLET
800 TABLET ONCE
Status: COMPLETED | OUTPATIENT
Start: 2018-02-18 | End: 2018-02-18

## 2018-02-18 RX ORDER — HYDRALAZINE HYDROCHLORIDE 25 MG/1
25 TABLET, FILM COATED ORAL EVERY 8 HOURS PRN
Status: DISCONTINUED | OUTPATIENT
Start: 2018-02-18 | End: 2018-02-20

## 2018-02-18 RX ORDER — SODIUM POLYSTYRENE SULFONATE 15 G/60ML
15 SUSPENSION ORAL; RECTAL ONCE
Status: COMPLETED | OUTPATIENT
Start: 2018-02-18 | End: 2018-02-18

## 2018-02-18 NOTE — PROGRESS NOTES
[facility]    Progress Note    Karlie Stevenson Patient Status:  Inpatient    1935 MRN [id number]   Location [address] [facility] Attending Mara Reece MD   Hosp Day # 2 PCP Onur Christie MD       SUBJECTIVE:    Awake alert u Facility-Administered Medications:  Sodium Polystyrene Sulfonate (KAYEXALATE) 15 GM/60ML suspension 15 g 15 g Oral Once   magnesium oxide (MAG-OX) tab 800 mg 800 mg Oral Once   sodium bicarbonate tab 650 mg Oral BID   Heparin Sodium (Porcine) 5000 UNIT/ML anemia which are stable.     Plan:    Continue with low potassium diet, sodium bicarbonate, IV fluids, the patient also advised to have good control of her blood sugar, and will get kidney ultrasound to evaluate kidney size and rule out hydronephrosis, also

## 2018-02-18 NOTE — PLAN OF CARE
Problem: METABOLIC/FLUID AND ELECTROLYTES - ADULT  Goal: Glucose maintained within prescribed range  INTERVENTIONS:  - Monitor Blood Glucose as ordered  - Assess for signs and symptoms of hyperglycemia and hypoglycemia  - Administer ordered medications to consult  -ac+hs  Monitor k+  - See additional Care Plan goals for specific interventions   Outcome: Progressing  Vss, patient calling appropriately, ivf infusing, hs meds given, no acute changes noted, will continue to monitor.

## 2018-02-18 NOTE — PROGRESS NOTES
[facility] Hospitalist Progress Note     PCP: Zachery Odonnell MD    CC: Follow up       Assessment/Plan: Mona Fonseca is a 80year old female with PMH sig for COPD, CKD stage 3 with baseline around 1.3-1.5 with trend up in 12/2018, GERD, HTN.  Hypothyroidi (36.8 °C)] 98.1 °F (36.7 °C)  Pulse:  [85] 85  Resp:  [18] 18  BP: (149-175)/(48-56) 151/48    Intake/Output:    Intake/Output Summary (Last 24 hours) at 02/18/18 1456  Last data filed at 02/18/18 1300   Gross per 24 hour   Intake             1560 ml   Out 2016 02/18/18   0618  02/18/18   1353   NA  139  138  140  139  140   K  5.8*  5.9*  5.6*  5.6*  5.1   CL  110  110  113*  113*  112*   CO2  22  20*  22  23  23   BUN  43*  40*  35*  34*  32*   CREATSERUM  2.55*  2.46*  2.35*  2.36*  2.17*   CA  8.7  8.9 hydronephrosis as discussed above. Moderate-sized stone or collection of stones at the lower pole of the left kidney without obstruction. 2 simple cysts on the left, one simple cyst on the right. No right-sided hydronephrosis.   Normal echogenicity bilatera

## 2018-02-18 NOTE — CONSULTS
[facility]    PATIENT'S NAME: Nesha Baez   ATTENDING PHYSICIAN: Allyson Parks MD   CONSULTING PHYSICIAN: Leslee Silveira MD   PATIENT ACCOUNT#:   [id number]    LOCATION:  [address] [place] [address] RECORD #:   [id number]       DATE OF BIRTH:  [birth year] patient denies blurred vision. Not hard of hearing. No chest pain or shortness of breath. No chills or fever. No nausea or vomiting or diarrhea. Appetite is good. No weight loss.        PHYSICAL EXAMINATION:    GENERAL:  The patient is awake, alert, b

## 2018-02-19 ENCOUNTER — APPOINTMENT (OUTPATIENT)
Dept: GENERAL RADIOLOGY | Facility: HOSPITAL | Age: 83
DRG: 682 | End: 2018-02-19
Attending: UROLOGY
Payer: MEDICARE

## 2018-02-19 ENCOUNTER — SURGERY (OUTPATIENT)
Age: 83
End: 2018-02-19

## 2018-02-19 ENCOUNTER — APPOINTMENT (OUTPATIENT)
Dept: CT IMAGING | Facility: HOSPITAL | Age: 83
DRG: 682 | End: 2018-02-19
Attending: HOSPITALIST
Payer: MEDICARE

## 2018-02-19 ENCOUNTER — ANESTHESIA (OUTPATIENT)
Dept: SURGERY | Facility: HOSPITAL | Age: 83
DRG: 682 | End: 2018-02-19
Payer: MEDICARE

## 2018-02-19 ENCOUNTER — ANESTHESIA EVENT (OUTPATIENT)
Dept: SURGERY | Facility: HOSPITAL | Age: 83
DRG: 682 | End: 2018-02-19
Payer: MEDICARE

## 2018-02-19 LAB
ANION GAP SERPL CALC-SCNC: 4 MMOL/L (ref 0–18)
ANION GAP SERPL CALC-SCNC: 8 MMOL/L (ref 0–18)
BASOPHILS # BLD: 0 K/UL (ref 0–0.2)
BASOPHILS NFR BLD: 1 %
BUN SERPL-MCNC: 26 MG/DL (ref 8–20)
BUN SERPL-MCNC: 29 MG/DL (ref 8–20)
BUN/CREAT SERPL: 13.1 (ref 10–20)
BUN/CREAT SERPL: 13.1 (ref 10–20)
CALCIUM SERPL-MCNC: 8.2 MG/DL (ref 8.5–10.5)
CALCIUM SERPL-MCNC: 8.4 MG/DL (ref 8.5–10.5)
CHLORIDE SERPL-SCNC: 111 MMOL/L (ref 95–110)
CHLORIDE SERPL-SCNC: 114 MMOL/L (ref 95–110)
CO2 SERPL-SCNC: 19 MMOL/L (ref 22–32)
CO2 SERPL-SCNC: 23 MMOL/L (ref 22–32)
CREAT SERPL-MCNC: 1.99 MG/DL (ref 0.5–1.5)
CREAT SERPL-MCNC: 2.21 MG/DL (ref 0.5–1.5)
EOSINOPHIL # BLD: 0.1 K/UL (ref 0–0.7)
EOSINOPHIL NFR BLD: 3 %
ERYTHROCYTE [DISTWIDTH] IN BLOOD BY AUTOMATED COUNT: 13.7 % (ref 11–15)
GLUCOSE BLDC GLUCOMTR-MCNC: 100 MG/DL (ref 70–99)
GLUCOSE BLDC GLUCOMTR-MCNC: 101 MG/DL (ref 70–99)
GLUCOSE BLDC GLUCOMTR-MCNC: 124 MG/DL (ref 70–99)
GLUCOSE BLDC GLUCOMTR-MCNC: 166 MG/DL (ref 70–99)
GLUCOSE BLDC GLUCOMTR-MCNC: 208 MG/DL (ref 70–99)
GLUCOSE BLDC GLUCOMTR-MCNC: 269 MG/DL (ref 70–99)
GLUCOSE SERPL-MCNC: 154 MG/DL (ref 70–99)
GLUCOSE SERPL-MCNC: 94 MG/DL (ref 70–99)
HCT VFR BLD AUTO: 25.8 % (ref 35–48)
HGB BLD-MCNC: 8.4 G/DL (ref 12–16)
LYMPHOCYTES # BLD: 1.6 K/UL (ref 1–4)
LYMPHOCYTES NFR BLD: 42 %
MAGNESIUM SERPL-MCNC: 1.4 MG/DL (ref 1.8–2.5)
MCH RBC QN AUTO: 28.2 PG (ref 27–32)
MCHC RBC AUTO-ENTMCNC: 32.4 G/DL (ref 32–37)
MCV RBC AUTO: 87.1 FL (ref 80–100)
MONOCYTES # BLD: 0.3 K/UL (ref 0–1)
MONOCYTES NFR BLD: 8 %
NEUTROPHILS # BLD AUTO: 1.7 K/UL (ref 1.8–7.7)
NEUTROPHILS NFR BLD: 45 %
OSMOLALITY UR CALC.SUM OF ELEC: 294 MOSM/KG (ref 275–295)
OSMOLALITY UR CALC.SUM OF ELEC: 298 MOSM/KG (ref 275–295)
PLATELET # BLD AUTO: 118 K/UL (ref 140–400)
PMV BLD AUTO: 8.3 FL (ref 7.4–10.3)
POTASSIUM SERPL-SCNC: 4.6 MMOL/L (ref 3.3–5.1)
POTASSIUM SERPL-SCNC: 4.9 MMOL/L (ref 3.3–5.1)
RBC # BLD AUTO: 2.97 M/UL (ref 3.7–5.4)
SODIUM SERPL-SCNC: 138 MMOL/L (ref 136–144)
SODIUM SERPL-SCNC: 141 MMOL/L (ref 136–144)
WBC # BLD AUTO: 3.9 K/UL (ref 4–11)

## 2018-02-19 PROCEDURE — 80048 BASIC METABOLIC PNL TOTAL CA: CPT | Performed by: INTERNAL MEDICINE

## 2018-02-19 PROCEDURE — BT1F1ZZ FLUOROSCOPY OF LEFT KIDNEY, URETER AND BLADDER USING LOW OSMOLAR CONTRAST: ICD-10-PCS | Performed by: UROLOGY

## 2018-02-19 PROCEDURE — 82962 GLUCOSE BLOOD TEST: CPT

## 2018-02-19 PROCEDURE — 83735 ASSAY OF MAGNESIUM: CPT | Performed by: HOSPITALIST

## 2018-02-19 PROCEDURE — 85025 COMPLETE CBC W/AUTO DIFF WBC: CPT | Performed by: HOSPITALIST

## 2018-02-19 PROCEDURE — 0T778DZ DILATION OF LEFT URETER WITH INTRALUMINAL DEVICE, VIA NATURAL OR ARTIFICIAL OPENING ENDOSCOPIC: ICD-10-PCS | Performed by: UROLOGY

## 2018-02-19 PROCEDURE — 74420 UROGRAPHY RTRGR +-KUB: CPT | Performed by: UROLOGY

## 2018-02-19 PROCEDURE — 80048 BASIC METABOLIC PNL TOTAL CA: CPT | Performed by: HOSPITALIST

## 2018-02-19 PROCEDURE — 74176 CT ABD & PELVIS W/O CONTRAST: CPT | Performed by: HOSPITALIST

## 2018-02-19 DEVICE — STENT URET 6F 24CM ULSMTH: Type: IMPLANTABLE DEVICE | Status: FUNCTIONAL

## 2018-02-19 RX ORDER — LIDOCAINE HYDROCHLORIDE 10 MG/ML
INJECTION, SOLUTION EPIDURAL; INFILTRATION; INTRACAUDAL; PERINEURAL AS NEEDED
Status: DISCONTINUED | OUTPATIENT
Start: 2018-02-19 | End: 2018-02-19 | Stop reason: SURG

## 2018-02-19 RX ORDER — MORPHINE SULFATE 2 MG/ML
2 INJECTION, SOLUTION INTRAMUSCULAR; INTRAVENOUS EVERY 10 MIN PRN
Status: DISCONTINUED | OUTPATIENT
Start: 2018-02-19 | End: 2018-02-19 | Stop reason: HOSPADM

## 2018-02-19 RX ORDER — DEXAMETHASONE SODIUM PHOSPHATE 4 MG/ML
VIAL (ML) INJECTION AS NEEDED
Status: DISCONTINUED | OUTPATIENT
Start: 2018-02-19 | End: 2018-02-19 | Stop reason: SURG

## 2018-02-19 RX ORDER — MAGNESIUM OXIDE 400 MG (241.3 MG MAGNESIUM) TABLET
800 TABLET ONCE
Status: DISCONTINUED | OUTPATIENT
Start: 2018-02-19 | End: 2018-02-19

## 2018-02-19 RX ORDER — DEXTROSE MONOHYDRATE 25 G/50ML
50 INJECTION, SOLUTION INTRAVENOUS
Status: DISCONTINUED | OUTPATIENT
Start: 2018-02-19 | End: 2018-02-19 | Stop reason: HOSPADM

## 2018-02-19 RX ORDER — MORPHINE SULFATE 4 MG/ML
4 INJECTION, SOLUTION INTRAMUSCULAR; INTRAVENOUS EVERY 10 MIN PRN
Status: DISCONTINUED | OUTPATIENT
Start: 2018-02-19 | End: 2018-02-19 | Stop reason: HOSPADM

## 2018-02-19 RX ORDER — MORPHINE SULFATE 10 MG/ML
6 INJECTION, SOLUTION INTRAMUSCULAR; INTRAVENOUS EVERY 10 MIN PRN
Status: DISCONTINUED | OUTPATIENT
Start: 2018-02-19 | End: 2018-02-19 | Stop reason: HOSPADM

## 2018-02-19 RX ORDER — NALOXONE HYDROCHLORIDE 0.4 MG/ML
80 INJECTION, SOLUTION INTRAMUSCULAR; INTRAVENOUS; SUBCUTANEOUS AS NEEDED
Status: DISCONTINUED | OUTPATIENT
Start: 2018-02-19 | End: 2018-02-19 | Stop reason: HOSPADM

## 2018-02-19 RX ORDER — VANCOMYCIN HYDROCHLORIDE 125 MG/1
125 CAPSULE ORAL 2 TIMES DAILY
Status: DISCONTINUED | OUTPATIENT
Start: 2018-02-19 | End: 2018-02-19

## 2018-02-19 RX ORDER — HYDROCODONE BITARTRATE AND ACETAMINOPHEN 5; 325 MG/1; MG/1
2 TABLET ORAL AS NEEDED
Status: DISCONTINUED | OUTPATIENT
Start: 2018-02-19 | End: 2018-02-19 | Stop reason: HOSPADM

## 2018-02-19 RX ORDER — HYDROCODONE BITARTRATE AND ACETAMINOPHEN 5; 325 MG/1; MG/1
1 TABLET ORAL AS NEEDED
Status: DISCONTINUED | OUTPATIENT
Start: 2018-02-19 | End: 2018-02-19 | Stop reason: HOSPADM

## 2018-02-19 RX ORDER — SODIUM CHLORIDE, SODIUM LACTATE, POTASSIUM CHLORIDE, CALCIUM CHLORIDE 600; 310; 30; 20 MG/100ML; MG/100ML; MG/100ML; MG/100ML
INJECTION, SOLUTION INTRAVENOUS CONTINUOUS PRN
Status: DISCONTINUED | OUTPATIENT
Start: 2018-02-19 | End: 2018-02-19 | Stop reason: SURG

## 2018-02-19 RX ORDER — ONDANSETRON 2 MG/ML
4 INJECTION INTRAMUSCULAR; INTRAVENOUS ONCE AS NEEDED
Status: DISCONTINUED | OUTPATIENT
Start: 2018-02-19 | End: 2018-02-19 | Stop reason: HOSPADM

## 2018-02-19 RX ORDER — CIPROFLOXACIN 2 MG/ML
400 INJECTION, SOLUTION INTRAVENOUS EVERY 12 HOURS
Status: COMPLETED | OUTPATIENT
Start: 2018-02-19 | End: 2018-02-19

## 2018-02-19 RX ORDER — CIPROFLOXACIN 500 MG/1
500 TABLET, FILM COATED ORAL
Status: DISCONTINUED | OUTPATIENT
Start: 2018-02-20 | End: 2018-02-20

## 2018-02-19 RX ORDER — METOPROLOL TARTRATE 5 MG/5ML
2.5 INJECTION INTRAVENOUS ONCE
Status: DISCONTINUED | OUTPATIENT
Start: 2018-02-19 | End: 2018-02-19 | Stop reason: HOSPADM

## 2018-02-19 RX ORDER — LIDOCAINE HYDROCHLORIDE 20 MG/ML
JELLY TOPICAL AS NEEDED
Status: DISCONTINUED | OUTPATIENT
Start: 2018-02-19 | End: 2018-02-19

## 2018-02-19 RX ORDER — HYDRALAZINE HYDROCHLORIDE 20 MG/ML
10 INJECTION INTRAMUSCULAR; INTRAVENOUS EVERY 6 HOURS PRN
Status: DISCONTINUED | OUTPATIENT
Start: 2018-02-19 | End: 2018-02-20

## 2018-02-19 RX ORDER — SODIUM CHLORIDE, SODIUM LACTATE, POTASSIUM CHLORIDE, CALCIUM CHLORIDE 600; 310; 30; 20 MG/100ML; MG/100ML; MG/100ML; MG/100ML
INJECTION, SOLUTION INTRAVENOUS CONTINUOUS
Status: DISCONTINUED | OUTPATIENT
Start: 2018-02-19 | End: 2018-02-19 | Stop reason: HOSPADM

## 2018-02-19 RX ORDER — MAGNESIUM SULFATE HEPTAHYDRATE 40 MG/ML
2 INJECTION, SOLUTION INTRAVENOUS ONCE
Status: COMPLETED | OUTPATIENT
Start: 2018-02-19 | End: 2018-02-19

## 2018-02-19 RX ADMIN — SODIUM CHLORIDE, SODIUM LACTATE, POTASSIUM CHLORIDE, CALCIUM CHLORIDE: 600; 310; 30; 20 INJECTION, SOLUTION INTRAVENOUS at 12:08:00

## 2018-02-19 RX ADMIN — LIDOCAINE HYDROCHLORIDE 25 MG: 10 INJECTION, SOLUTION EPIDURAL; INFILTRATION; INTRACAUDAL; PERINEURAL at 12:15:00

## 2018-02-19 RX ADMIN — SODIUM CHLORIDE, SODIUM LACTATE, POTASSIUM CHLORIDE, CALCIUM CHLORIDE: 600; 310; 30; 20 INJECTION, SOLUTION INTRAVENOUS at 12:37:00

## 2018-02-19 RX ADMIN — DEXAMETHASONE SODIUM PHOSPHATE 4 MG: 4 MG/ML VIAL (ML) INJECTION at 12:15:00

## 2018-02-19 RX ADMIN — ONDANSETRON 4 MG: 2 INJECTION INTRAMUSCULAR; INTRAVENOUS at 12:15:00

## 2018-02-19 NOTE — ANESTHESIA PREPROCEDURE EVALUATION
Anesthesia PreOp Note    HPI:     Janae Koenig is a 80year old female who presents for preoperative consultation requested by: Marry Ortiz MD    Date of Surgery: 2/16/2018 - 2/19/2018    Procedure(s):  CYSTOSCOPY STENT INSERTION  Indication: Hydron EXCISION OF LEG MASS;  Surgeon:                José Miguel Harper; Location: [address] [place] [address],[address]  1/28/2013: [address] 1.1-2CM TRUNK,ARM,LEG      Comment: Procedure: IMWOGXIK OF LEG MASS;  Surgeon:                José Miguel Harper;   Naila ESCITALOPRAM 20 MG Oral Tab TAKE 1 TABLET (20 MG TOTAL) BY MOUTH ONCE DAILY.  Disp: 90 tablet Rfl: 0 2/16/2018 at Unknown time   Glucose Blood (ARNULFO CONTOUR NEXT TEST) In Vitro Strip Check blood sugars daily Disp: 100 strip Rfl: 3    Insulin Pen Needle 3 BID Haroon Frausto  mg at 02/18/18 0849   PEG 3350 (MIRALAX) powder packet 17 g 17 g Oral Daily PRN Haroon Frausto MD    bisacodyl (DULCOLAX) rectal suppository 10 mg 10 mg Rectal Daily PRN Haroon Frausto MD    ondansetron HCl (Z [Procaine *    Bradyarrhythmia    Comment:Per patient high heart rate  Aspirin                 Nausea and vomiting  Codeine                 Nausea and vomiting, Dizziness  Penicillins                 Family History   Problem Relation Age of Onset   • Heart lb). Her oral temperature is 97.8 °F (36.6 °C). Her blood pressure is 178/70 (abnormal) and her pulse is 72. Her respiration is 20 and oxygen saturation is 93%.     02/18/18 2052 02/18/18  2227 02/19/18  0859 02/19/18  0953   BP: (!) 174/58 (!) 176/60 (!)

## 2018-02-19 NOTE — PLAN OF CARE
Problem: METABOLIC/FLUID AND ELECTROLYTES - ADULT  Goal: Glucose maintained within prescribed range  INTERVENTIONS:  - Monitor Blood Glucose as ordered  - Assess for signs and symptoms of hyperglycemia and hypoglycemia  - Administer ordered medications to decision-making at the level they choose  - Honor patient and family perspectives and choices   Outcome: Progressing      Problem: Patient/Family Goals  Goal: Patient/Family Long Term Goal  Patient's Long Term Goal: to go home     Interventions:  - monitor

## 2018-02-19 NOTE — PROGRESS NOTES
[facility]    Progress Note    Mauricio Pleitez Patient Status:  Inpatient    1935 MRN [id number]   Location [address] [facility] Attending Sean Chau MD   Hosp Day # 3 PCP Ben Sanchez MD       Subjective:    Mauricio Pleitez i nonobstructing left renal calculi measuring up to 4 mm. 3. Right renal atrophy and scarring. 4. Splenomegaly. 5. Multiple enlarged gastrohepatic, paraesophageal, retroperitoneal, and iliac chain lymph nodes measuring up to 10 mm short axis.  Some have in

## 2018-02-19 NOTE — OPERATIVE REPORT
Operative Report    Everett Price  4/19/1935  [id number]    02/19/18      Preoperative Diagnosis:  1, Obstructing left ureteral calculus with hydronephrosis    Postoperative Diagnosis: same    Procedure: Cystoscopy, left retrograde py ureteral orifice was intubated, and the wire was passed until curled in the renal pelvis on fluoroscopy. A 6F open-ended catheter was advanced over the indwelling wire, and retrograde pyelography was performed. It demonstrates the following: finding (2).

## 2018-02-19 NOTE — ANESTHESIA POSTPROCEDURE EVALUATION
Patient:  Danyel [facility]    Procedure Summary     Date:  02/19/18 Room / Location:  [facility] OR  / [facility] OR    Anesthesia Start:  6899 Anesthesia Stop:      Procedure:  CYSTOSCOPY STENT INSERTION (N/A ) Diagnosis:       Hydronephrosis      (hydronephro

## 2018-02-19 NOTE — CONSULTS
Inpatient Consultation - KIDNEY / URETERAL STONE      Place of Service: [facility]  Reason for Consultation:  Left hydronephrosis, Left nephrolithiasis/ Kidney failure      Consult Requested by: Dr. Kleber Morton     History of Present Illness:  Crystal Peña COPD (chronic obstructive pulmonary disease) (HCC)    • Depression    • Esophageal reflux    • HTN (hypertension)    • Hyperkalemia    • Hypothyroid    • Mitral regurgitation 2007    Mild to moderate   • Other and unspecified hyperlipidemia    • Pulmonary Oral Tab EC TAKE ONE TABLET BY MOUTH TWICE DAILY WITH MEALS.  Disp: 60 tablet Rfl: 2   CIPROFLOXACIN  MG Oral Tab TAKE ONE TABLET BY MOUTH TWICE DAILY  Disp: 60 tablet Rfl: 0   SULFAMETHOXAZOLE-TMP -160 MG Oral Tab per tablet TAKE ONE TABLET BY every 8 (eight) hours as needed for Nausea. Disp: 20 tablet Rfl: 3   Blood Glucose Monitoring Suppl (ARNULFO CONTOUR NEXT EZ) W/DEVICE Does not apply Kit 1 Device by Does not apply route daily.  Disp: 1 kit Rfl: 0   Lancets (LIFESCAN UNISTIK 2) Does not apply abnormality / atraumatic  Abdomen: soft, non-tender, non-distended. No suprapubic fullness or tenderness.    Geniturinary: no CVA  Tenderness   Skin: no rashes or lesions  Musculoskeletal: moves all four extremities equally  Psychological: alert and oriente

## 2018-02-19 NOTE — PROGRESS NOTES
[facility] Hospitalist Progress Note     PCP: Janet Mckeon MD    CC: Follow up       Assessment/Plan: Paulette Barrientos is a 80year old female with PMH sig for COPD, CKD stage 3 with baseline around 1.3-1.5 with trend up in 12/2018, GERD, HTN.  Hypothyroidi or SOB       Objective     OBJECTIVE:  Temp:  [97.4 °F (36.3 °C)-98.2 °F (36.8 °C)] 97.4 °F (36.3 °C)  Pulse:  [] 91  Resp:  [10-20] 12  BP: (146-188)/(58-72) 156/62    Intake/Output:    Intake/Output Summary (Last 24 hours) at 02/19/18 4741  Last da 118*       Recent Labs   Lab  02/17/18   0621   02/17/18   2016  02/18/18   0618  02/18/18   1353  02/19/18   0449  02/19/18   1604   NA  139   < >  140  139  140  141  138   K  5.8*   < >  5.6*  5.6*  5.1  4.6  4.9   CL  110   < >  113*  113*  112*  114* of the left kidney measures 1.9 x 1.4 x 1.6 cm. Additional simple cyst inferior left kidney measuring 1.4 x 1.5 x 1.4 cm. Normal echogenicity. BLADDER: Not well distended and poorly visualized. CONCLUSION:  1.  There is mild left-sided hydronephrosis as di incidental findings as above.

## 2018-02-20 VITALS
TEMPERATURE: 98 F | BODY MASS INDEX: 42.68 KG/M2 | OXYGEN SATURATION: 99 % | HEIGHT: 64 IN | SYSTOLIC BLOOD PRESSURE: 151 MMHG | HEART RATE: 75 BPM | WEIGHT: 250 LBS | RESPIRATION RATE: 20 BRPM | DIASTOLIC BLOOD PRESSURE: 55 MMHG

## 2018-02-20 LAB
ANION GAP SERPL CALC-SCNC: 6 MMOL/L (ref 0–18)
BASOPHILS # BLD: 0 K/UL (ref 0–0.2)
BASOPHILS NFR BLD: 1 %
BUN SERPL-MCNC: 30 MG/DL (ref 8–20)
BUN/CREAT SERPL: 15.5 (ref 10–20)
CALCIUM SERPL-MCNC: 8.5 MG/DL (ref 8.5–10.5)
CHLORIDE SERPL-SCNC: 111 MMOL/L (ref 95–110)
CO2 SERPL-SCNC: 23 MMOL/L (ref 22–32)
CREAT SERPL-MCNC: 1.94 MG/DL (ref 0.5–1.5)
EOSINOPHIL # BLD: 0 K/UL (ref 0–0.7)
EOSINOPHIL NFR BLD: 1 %
ERYTHROCYTE [DISTWIDTH] IN BLOOD BY AUTOMATED COUNT: 13.8 % (ref 11–15)
GLUCOSE BLDC GLUCOMTR-MCNC: 131 MG/DL (ref 70–99)
GLUCOSE SERPL-MCNC: 138 MG/DL (ref 70–99)
HCT VFR BLD AUTO: 26.5 % (ref 35–48)
HGB BLD-MCNC: 8.8 G/DL (ref 12–16)
LYMPHOCYTES # BLD: 1.6 K/UL (ref 1–4)
LYMPHOCYTES NFR BLD: 42 %
MAGNESIUM SERPL-MCNC: 1.7 MG/DL (ref 1.8–2.5)
MCH RBC QN AUTO: 28.7 PG (ref 27–32)
MCHC RBC AUTO-ENTMCNC: 33.3 G/DL (ref 32–37)
MCV RBC AUTO: 86.2 FL (ref 80–100)
MONOCYTES # BLD: 0.2 K/UL (ref 0–1)
MONOCYTES NFR BLD: 4 %
NEUTROPHILS # BLD AUTO: 2 K/UL (ref 1.8–7.7)
NEUTROPHILS NFR BLD: 53 %
OSMOLALITY UR CALC.SUM OF ELEC: 298 MOSM/KG (ref 275–295)
PHOSPHATE SERPL-MCNC: 3.6 MG/DL (ref 2.4–4.7)
PLATELET # BLD AUTO: 131 K/UL (ref 140–400)
PMV BLD AUTO: 8.1 FL (ref 7.4–10.3)
POTASSIUM SERPL-SCNC: 4.7 MMOL/L (ref 3.3–5.1)
RBC # BLD AUTO: 3.08 M/UL (ref 3.7–5.4)
SODIUM SERPL-SCNC: 140 MMOL/L (ref 136–144)
WBC # BLD AUTO: 3.8 K/UL (ref 4–11)

## 2018-02-20 PROCEDURE — 80048 BASIC METABOLIC PNL TOTAL CA: CPT | Performed by: INTERNAL MEDICINE

## 2018-02-20 PROCEDURE — 85025 COMPLETE CBC W/AUTO DIFF WBC: CPT | Performed by: HOSPITALIST

## 2018-02-20 PROCEDURE — 83735 ASSAY OF MAGNESIUM: CPT | Performed by: HOSPITALIST

## 2018-02-20 PROCEDURE — 82962 GLUCOSE BLOOD TEST: CPT

## 2018-02-20 PROCEDURE — 84100 ASSAY OF PHOSPHORUS: CPT | Performed by: INTERNAL MEDICINE

## 2018-02-20 RX ORDER — MAGNESIUM OXIDE 400 MG (241.3 MG MAGNESIUM) TABLET
400 TABLET ONCE
Status: COMPLETED | OUTPATIENT
Start: 2018-02-20 | End: 2018-02-20

## 2018-02-20 RX ORDER — SODIUM BICARBONATE 650 MG/1
650 TABLET ORAL DAILY
Status: DISCONTINUED | OUTPATIENT
Start: 2018-02-21 | End: 2018-02-20

## 2018-02-20 NOTE — PLAN OF CARE
Problem: METABOLIC/FLUID AND ELECTROLYTES - ADULT  Goal: Glucose maintained within prescribed range  INTERVENTIONS:  - Monitor Blood Glucose as ordered  - Assess for signs and symptoms of hyperglycemia and hypoglycemia  - Administer ordered medications to level they choose  - Honor patient and family perspectives and choices   Outcome: Progressing      Problem: Patient/Family Goals  Goal: Patient/Family Long Term Goal  Patient's Long Term Goal: to go home     Interventions:  - monitor lab values  -monitor v

## 2018-02-20 NOTE — DISCHARGE SUMMARY
[facility] Internal Medicine Discharge Summary   Patient ID:  Luana Gottlieb  [id number]  51 year old  4/19/1935    Admit date: 2/16/2018    Discharge date and time: 2/20/2018 12:56 PM     Attending Physician: No att. providers found     Primary Care Physician: Xi Pinedo take     Vancomycin HCl 125 MG Caps  Commonly known as:  VANCOCIN  TAKE ONE CAPSULE BY MOUTH TWICE DAILY  What changed:  Another medication with the same name was removed. Continue taking this medication, and follow the directions you see here.         CONT per tablet  Commonly known as:  BACTRIM DS  TAKE ONE TABLET BY MOUTH TWICE DAILY        * This list has 4 medication(s) that are the same as other medications prescribed for you.  Read the directions carefully, and ask your doctor or other care provider to d/c  -cont home meds incluidng abx  -follow up with pulm/ID  -at discharge sx improved     CLL  -baseline anemia, runs around 8-10  -stable  -per heme as outpt     HTN, HL  -cont home meds     Hypothryoidism  -cont home med     Type 2 DM on insulin  -cont FINDINGS:  Lack of IV contrast limits the evaluation of solid organs, bowel, and vasculature for subtle pathology.   Accounting for these limitations, the following observations are made:   LIVER: Low density probable cyst in the left hepatic lobe measuring 2/19/2018 at 10:05          CONCLUSION:   1. Moderate left hydronephrosis related to a 6 mm calculus at the ureteropelvic junction. There is left perinephric edema, raising the possibility of pyelonephritis.   2. Multiple additional nonobstructing left herminia x 0.5 x 1.0 cm without obstruction. Simple appearing cyst in midpole of the left kidney measures 1.9 x 1.4 x 1.6 cm. Additional simple cyst inferior left kidney measuring 1.4 x 1.5 x 1.4 cm. Normal echogenicity.  BLADDER: Not well distended and poorly visua cardiomegaly. 3. Nonspecific prominent interstitial markings. 4. Lesser incidental findings as above. Operative Procedures: Procedure(s) (LRB):  CYSTOSCOPY STENT INSERTION (N/A)     Day of discharge feels good today, no Cp or SOB.   No n/V    Exa

## 2018-02-20 NOTE — PROGRESS NOTES
[facility]    Progress Note    Ashwin Riddles Patient Status:  Inpatient    1935 MRN [id number]   Location [address] [facility] Attending Lamar Crockett MD   Hosp Day # 4 PCP Richard Humphreys MD       Subjective:    Ashwin Riddles i raising the possibility of pyelonephritis. 2. Multiple additional nonobstructing left renal calculi measuring up to 4 mm. 3. Right renal atrophy and scarring. 4. Splenomegaly.   5. Multiple enlarged gastrohepatic, paraesophageal, retroperitoneal, and andrea

## 2018-02-20 NOTE — PLAN OF CARE
Patient is awake, alert and oriented times four. She wears oxygen at 2-3L/nasal cannula on a prn basis. She is tolerating an 1800 anette ADA, low salt and low potassium diet. She ambulates on her own.   Telemetry has been discontinued and the saline lock rem

## 2018-02-20 NOTE — PROGRESS NOTES
Urology- Daily Progress Note    Following for: left obstructing ureteral stone    Subjective  No overnight events.   Minimal discomfort with urination- not bothersome  Eager to go home        Objective  /55 (BP Location: Right arm)   Pulse 75   Temp

## 2018-02-23 ENCOUNTER — LAB REQUISITION (OUTPATIENT)
Dept: LAB | Facility: HOSPITAL | Age: 83
End: 2018-02-23
Payer: MEDICARE

## 2018-02-23 DIAGNOSIS — I12.9 HYPERTENSIVE CHRONIC KIDNEY DISEASE WITH STAGE 1 THROUGH STAGE 4 CHRONIC KIDNEY DISEASE, OR UNSPECIFIED CHRONIC KIDNEY DISEASE: ICD-10-CM

## 2018-02-23 LAB
ANION GAP SERPL CALC-SCNC: 9 MMOL/L (ref 0–18)
BUN SERPL-MCNC: 24 MG/DL (ref 8–20)
BUN/CREAT SERPL: 12.9 (ref 10–20)
CALCIUM SERPL-MCNC: 8.7 MG/DL (ref 8.5–10.5)
CHLORIDE SERPL-SCNC: 108 MMOL/L (ref 95–110)
CO2 SERPL-SCNC: 22 MMOL/L (ref 22–32)
CREAT SERPL-MCNC: 1.86 MG/DL (ref 0.5–1.5)
GLUCOSE SERPL-MCNC: 118 MG/DL (ref 70–99)
OSMOLALITY UR CALC.SUM OF ELEC: 293 MOSM/KG (ref 275–295)
POTASSIUM SERPL-SCNC: 4.5 MMOL/L (ref 3.3–5.1)
SODIUM SERPL-SCNC: 139 MMOL/L (ref 136–144)

## 2018-02-23 PROCEDURE — 80048 BASIC METABOLIC PNL TOTAL CA: CPT

## 2018-02-27 ENCOUNTER — LAB REQUISITION (OUTPATIENT)
Dept: LAB | Facility: HOSPITAL | Age: 83
End: 2018-02-27
Payer: MEDICARE

## 2018-02-27 DIAGNOSIS — N20.1 CALCULUS OF URETER: ICD-10-CM

## 2018-02-27 LAB
ANION GAP SERPL CALC-SCNC: 7 MMOL/L (ref 0–18)
BUN SERPL-MCNC: 26 MG/DL (ref 8–20)
BUN/CREAT SERPL: 11.2 (ref 10–20)
CALCIUM SERPL-MCNC: 8.8 MG/DL (ref 8.5–10.5)
CHLORIDE SERPL-SCNC: 110 MMOL/L (ref 95–110)
CO2 SERPL-SCNC: 20 MMOL/L (ref 22–32)
CREAT SERPL-MCNC: 2.32 MG/DL (ref 0.5–1.5)
GLUCOSE SERPL-MCNC: 147 MG/DL (ref 70–99)
OSMOLALITY UR CALC.SUM OF ELEC: 291 MOSM/KG (ref 275–295)
POTASSIUM SERPL-SCNC: 4.5 MMOL/L (ref 3.3–5.1)
SODIUM SERPL-SCNC: 137 MMOL/L (ref 136–144)

## 2018-02-27 PROCEDURE — 80048 BASIC METABOLIC PNL TOTAL CA: CPT | Performed by: FAMILY MEDICINE

## 2018-03-02 ENCOUNTER — LAB REQUISITION (OUTPATIENT)
Dept: LAB | Facility: HOSPITAL | Age: 83
End: 2018-03-02
Payer: MEDICARE

## 2018-03-02 DIAGNOSIS — N20.1 CALCULUS OF URETER: ICD-10-CM

## 2018-03-02 LAB
ANION GAP SERPL CALC-SCNC: 10 MMOL/L (ref 0–18)
BUN SERPL-MCNC: 31 MG/DL (ref 8–20)
BUN/CREAT SERPL: 18.6 (ref 10–20)
CALCIUM SERPL-MCNC: 9.3 MG/DL (ref 8.5–10.5)
CHLORIDE SERPL-SCNC: 107 MMOL/L (ref 95–110)
CO2 SERPL-SCNC: 20 MMOL/L (ref 22–32)
CREAT SERPL-MCNC: 1.67 MG/DL (ref 0.5–1.5)
GLUCOSE SERPL-MCNC: 153 MG/DL (ref 70–99)
OSMOLALITY UR CALC.SUM OF ELEC: 294 MOSM/KG (ref 275–295)
POTASSIUM SERPL-SCNC: 5.3 MMOL/L (ref 3.3–5.1)
SODIUM SERPL-SCNC: 137 MMOL/L (ref 136–144)

## 2018-03-02 PROCEDURE — 80048 BASIC METABOLIC PNL TOTAL CA: CPT | Performed by: FAMILY MEDICINE

## 2018-03-06 ENCOUNTER — LAB REQUISITION (OUTPATIENT)
Dept: LAB | Facility: HOSPITAL | Age: 83
End: 2018-03-06
Payer: MEDICARE

## 2018-03-06 DIAGNOSIS — N20.1 CALCULUS OF URETER: ICD-10-CM

## 2018-03-06 LAB
ANION GAP SERPL CALC-SCNC: 6 MMOL/L (ref 0–18)
BUN SERPL-MCNC: 29 MG/DL (ref 8–20)
BUN/CREAT SERPL: 17.5 (ref 10–20)
CALCIUM SERPL-MCNC: 9 MG/DL (ref 8.5–10.5)
CHLORIDE SERPL-SCNC: 107 MMOL/L (ref 95–110)
CO2 SERPL-SCNC: 23 MMOL/L (ref 22–32)
CREAT SERPL-MCNC: 1.66 MG/DL (ref 0.5–1.5)
GLUCOSE SERPL-MCNC: 185 MG/DL (ref 70–99)
OSMOLALITY UR CALC.SUM OF ELEC: 293 MOSM/KG (ref 275–295)
POTASSIUM SERPL-SCNC: 5.1 MMOL/L (ref 3.3–5.1)
SODIUM SERPL-SCNC: 136 MMOL/L (ref 136–144)

## 2018-03-06 PROCEDURE — 80048 BASIC METABOLIC PNL TOTAL CA: CPT | Performed by: FAMILY MEDICINE

## 2018-03-08 NOTE — PROGRESS NOTES
Pt states she does not believe she is taking anything with potassium in it - she will look at any vitamin/otc supplements she may have and stop them

## 2018-03-09 ENCOUNTER — LAB REQUISITION (OUTPATIENT)
Dept: LAB | Facility: HOSPITAL | Age: 83
End: 2018-03-09
Payer: MEDICARE

## 2018-03-09 DIAGNOSIS — N20.1 CALCULUS OF URETER: ICD-10-CM

## 2018-03-09 LAB
ANION GAP SERPL CALC-SCNC: 4 MMOL/L (ref 0–18)
BUN SERPL-MCNC: 20 MG/DL (ref 8–20)
BUN/CREAT SERPL: 12.2 (ref 10–20)
CALCIUM SERPL-MCNC: 8.8 MG/DL (ref 8.5–10.5)
CHLORIDE SERPL-SCNC: 109 MMOL/L (ref 95–110)
CO2 SERPL-SCNC: 23 MMOL/L (ref 22–32)
CREAT SERPL-MCNC: 1.64 MG/DL (ref 0.5–1.5)
GLUCOSE SERPL-MCNC: 143 MG/DL (ref 70–99)
OSMOLALITY UR CALC.SUM OF ELEC: 287 MOSM/KG (ref 275–295)
POTASSIUM SERPL-SCNC: 4.9 MMOL/L (ref 3.3–5.1)
SODIUM SERPL-SCNC: 136 MMOL/L (ref 136–144)

## 2018-03-09 PROCEDURE — 80048 BASIC METABOLIC PNL TOTAL CA: CPT | Performed by: FAMILY MEDICINE

## 2018-03-14 ENCOUNTER — LAB REQUISITION (OUTPATIENT)
Dept: LAB | Facility: HOSPITAL | Age: 83
End: 2018-03-14
Payer: MEDICARE

## 2018-03-14 DIAGNOSIS — N18.30 CHRONIC KIDNEY DISEASE, STAGE III (MODERATE) (HCC): ICD-10-CM

## 2018-03-14 LAB
ANION GAP SERPL CALC-SCNC: 8 MMOL/L (ref 0–18)
BUN SERPL-MCNC: 28 MG/DL (ref 8–20)
BUN/CREAT SERPL: 16.8 (ref 10–20)
CALCIUM SERPL-MCNC: 9.2 MG/DL (ref 8.5–10.5)
CHLORIDE SERPL-SCNC: 107 MMOL/L (ref 95–110)
CO2 SERPL-SCNC: 21 MMOL/L (ref 22–32)
CREAT SERPL-MCNC: 1.67 MG/DL (ref 0.5–1.5)
GLUCOSE SERPL-MCNC: 138 MG/DL (ref 70–99)
OSMOLALITY UR CALC.SUM OF ELEC: 290 MOSM/KG (ref 275–295)
POTASSIUM SERPL-SCNC: 5.4 MMOL/L (ref 3.3–5.1)
SODIUM SERPL-SCNC: 136 MMOL/L (ref 136–144)

## 2018-03-14 PROCEDURE — 80048 BASIC METABOLIC PNL TOTAL CA: CPT

## 2018-03-14 RX ORDER — CIDER VINEGAR 300 MG
TABLET ORAL
COMMUNITY
End: 2019-04-30

## 2018-03-16 ENCOUNTER — LAB REQUISITION (OUTPATIENT)
Dept: LAB | Facility: HOSPITAL | Age: 83
End: 2018-03-16
Payer: MEDICARE

## 2018-03-16 DIAGNOSIS — N13.2 HYDRONEPHROSIS WITH RENAL AND URETERAL CALCULUS OBSTRUCTION: ICD-10-CM

## 2018-03-16 DIAGNOSIS — N20.1 CALCULUS OF URETER: ICD-10-CM

## 2018-03-16 DIAGNOSIS — N18.30 CHRONIC KIDNEY DISEASE, STAGE III (MODERATE) (HCC): ICD-10-CM

## 2018-03-16 LAB
CALCIUM SERPL-MCNC: 8.7 MG/DL (ref 8.5–10.5)
CREAT UR-MCNC: 81.9 MG/DL
PHOSPHATE SERPL-MCNC: 3 MG/DL (ref 2.4–4.7)
PROT UR-MCNC: 112 MG/DL
PTH-INTACT SERPL-MCNC: 102.9 PG/ML (ref 12–88)
RBC #/AREA URNS AUTO: 2479 /HPF
WBC #/AREA URNS AUTO: 31 /HPF

## 2018-03-16 PROCEDURE — 81015 MICROSCOPIC EXAM OF URINE: CPT | Performed by: FAMILY MEDICINE

## 2018-03-16 PROCEDURE — 82570 ASSAY OF URINE CREATININE: CPT | Performed by: FAMILY MEDICINE

## 2018-03-16 PROCEDURE — 83970 ASSAY OF PARATHORMONE: CPT | Performed by: FAMILY MEDICINE

## 2018-03-16 PROCEDURE — 84156 ASSAY OF PROTEIN URINE: CPT | Performed by: FAMILY MEDICINE

## 2018-03-16 PROCEDURE — 82310 ASSAY OF CALCIUM: CPT | Performed by: FAMILY MEDICINE

## 2018-03-16 PROCEDURE — 84100 ASSAY OF PHOSPHORUS: CPT | Performed by: FAMILY MEDICINE

## 2018-03-16 NOTE — PROGRESS NOTES
These labs were ordered under my name but I didn't order them. I would think Dr Daigle(nephrology) or James(urology) ordered them so forward them to them.

## 2018-03-19 ENCOUNTER — ANESTHESIA (OUTPATIENT)
Dept: SURGERY | Facility: HOSPITAL | Age: 83
End: 2018-03-19
Payer: MEDICARE

## 2018-03-19 ENCOUNTER — ANESTHESIA EVENT (OUTPATIENT)
Dept: SURGERY | Facility: HOSPITAL | Age: 83
End: 2018-03-19
Payer: MEDICARE

## 2018-03-19 ENCOUNTER — SURGERY (OUTPATIENT)
Age: 83
End: 2018-03-19

## 2018-03-19 ENCOUNTER — APPOINTMENT (OUTPATIENT)
Dept: GENERAL RADIOLOGY | Facility: HOSPITAL | Age: 83
End: 2018-03-19
Attending: UROLOGY
Payer: MEDICARE

## 2018-03-19 ENCOUNTER — HOSPITAL ENCOUNTER (OUTPATIENT)
Facility: HOSPITAL | Age: 83
Setting detail: HOSPITAL OUTPATIENT SURGERY
Discharge: HOME | End: 2018-03-19
Attending: UROLOGY | Admitting: UROLOGY
Payer: MEDICARE

## 2018-03-19 VITALS
BODY MASS INDEX: 43.19 KG/M2 | HEIGHT: 64 IN | TEMPERATURE: 97 F | DIASTOLIC BLOOD PRESSURE: 49 MMHG | OXYGEN SATURATION: 94 % | HEART RATE: 69 BPM | RESPIRATION RATE: 14 BRPM | WEIGHT: 253 LBS | SYSTOLIC BLOOD PRESSURE: 138 MMHG

## 2018-03-19 LAB
GLUCOSE BLDC GLUCOMTR-MCNC: 177 MG/DL (ref 70–99)
GLUCOSE BLDC GLUCOMTR-MCNC: 187 MG/DL (ref 70–99)

## 2018-03-19 PROCEDURE — 82962 GLUCOSE BLOOD TEST: CPT

## 2018-03-19 PROCEDURE — 87205 SMEAR GRAM STAIN: CPT | Performed by: UROLOGY

## 2018-03-19 PROCEDURE — 88300 SURGICAL PATH GROSS: CPT | Performed by: UROLOGY

## 2018-03-19 PROCEDURE — 88305 TISSUE EXAM BY PATHOLOGIST: CPT | Performed by: UROLOGY

## 2018-03-19 PROCEDURE — 87075 CULTR BACTERIA EXCEPT BLOOD: CPT | Performed by: UROLOGY

## 2018-03-19 PROCEDURE — S0028 INJECTION, FAMOTIDINE, 20 MG: HCPCS

## 2018-03-19 PROCEDURE — 0TC78ZZ EXTIRPATION OF MATTER FROM LEFT URETER, VIA NATURAL OR ARTIFICIAL OPENING ENDOSCOPIC: ICD-10-PCS | Performed by: UROLOGY

## 2018-03-19 PROCEDURE — 0T778DZ DILATION OF LEFT URETER WITH INTRALUMINAL DEVICE, VIA NATURAL OR ARTIFICIAL OPENING ENDOSCOPIC: ICD-10-PCS | Performed by: UROLOGY

## 2018-03-19 PROCEDURE — 87070 CULTURE OTHR SPECIMN AEROBIC: CPT | Performed by: UROLOGY

## 2018-03-19 PROCEDURE — 74420 UROGRAPHY RTRGR +-KUB: CPT | Performed by: UROLOGY

## 2018-03-19 PROCEDURE — 82365 CALCULUS SPECTROSCOPY: CPT | Performed by: UROLOGY

## 2018-03-19 DEVICE — STENT URET 6F 24CM ULSMTH: Type: IMPLANTABLE DEVICE | Site: URETER | Status: FUNCTIONAL

## 2018-03-19 RX ORDER — ONDANSETRON 2 MG/ML
INJECTION INTRAMUSCULAR; INTRAVENOUS AS NEEDED
Status: DISCONTINUED | OUTPATIENT
Start: 2018-03-19 | End: 2018-03-19 | Stop reason: SURG

## 2018-03-19 RX ORDER — FAMOTIDINE 10 MG/ML
20 INJECTION, SOLUTION INTRAVENOUS ONCE
Status: COMPLETED | OUTPATIENT
Start: 2018-03-19 | End: 2018-03-19

## 2018-03-19 RX ORDER — LIDOCAINE HYDROCHLORIDE 20 MG/ML
JELLY TOPICAL AS NEEDED
Status: DISCONTINUED | OUTPATIENT
Start: 2018-03-19 | End: 2018-03-19 | Stop reason: HOSPADM

## 2018-03-19 RX ORDER — SODIUM CHLORIDE, SODIUM LACTATE, POTASSIUM CHLORIDE, CALCIUM CHLORIDE 600; 310; 30; 20 MG/100ML; MG/100ML; MG/100ML; MG/100ML
INJECTION, SOLUTION INTRAVENOUS CONTINUOUS
Status: DISCONTINUED | OUTPATIENT
Start: 2018-03-19 | End: 2018-03-19

## 2018-03-19 RX ORDER — SODIUM CHLORIDE 9 MG/ML
INJECTION, SOLUTION INTRAVENOUS CONTINUOUS
Status: DISCONTINUED | OUTPATIENT
Start: 2018-03-19 | End: 2018-03-19

## 2018-03-19 RX ORDER — NALOXONE HYDROCHLORIDE 0.4 MG/ML
80 INJECTION, SOLUTION INTRAMUSCULAR; INTRAVENOUS; SUBCUTANEOUS AS NEEDED
Status: DISCONTINUED | OUTPATIENT
Start: 2018-03-19 | End: 2018-03-19

## 2018-03-19 RX ORDER — MORPHINE SULFATE 10 MG/ML
6 INJECTION, SOLUTION INTRAMUSCULAR; INTRAVENOUS EVERY 10 MIN PRN
Status: DISCONTINUED | OUTPATIENT
Start: 2018-03-19 | End: 2018-03-19

## 2018-03-19 RX ORDER — CIPROFLOXACIN 2 MG/ML
400 INJECTION, SOLUTION INTRAVENOUS ONCE
Status: COMPLETED | OUTPATIENT
Start: 2018-03-19 | End: 2018-03-19

## 2018-03-19 RX ORDER — TRAMADOL HYDROCHLORIDE 50 MG/1
50 TABLET ORAL EVERY 6 HOURS PRN
Qty: 30 TABLET | Refills: 0 | Status: SHIPPED | OUTPATIENT
Start: 2018-03-19 | End: 2018-05-15

## 2018-03-19 RX ORDER — DEXTROSE MONOHYDRATE 25 G/50ML
50 INJECTION, SOLUTION INTRAVENOUS
Status: DISCONTINUED | OUTPATIENT
Start: 2018-03-19 | End: 2018-03-19

## 2018-03-19 RX ORDER — HYDROCODONE BITARTRATE AND ACETAMINOPHEN 5; 325 MG/1; MG/1
2 TABLET ORAL AS NEEDED
Status: DISCONTINUED | OUTPATIENT
Start: 2018-03-19 | End: 2018-03-19

## 2018-03-19 RX ORDER — HALOPERIDOL 5 MG/ML
0.25 INJECTION INTRAMUSCULAR ONCE AS NEEDED
Status: DISCONTINUED | OUTPATIENT
Start: 2018-03-19 | End: 2018-03-19

## 2018-03-19 RX ORDER — MIDAZOLAM HYDROCHLORIDE 1 MG/ML
INJECTION INTRAMUSCULAR; INTRAVENOUS AS NEEDED
Status: DISCONTINUED | OUTPATIENT
Start: 2018-03-19 | End: 2018-03-19 | Stop reason: SURG

## 2018-03-19 RX ORDER — MORPHINE SULFATE 2 MG/ML
2 INJECTION, SOLUTION INTRAMUSCULAR; INTRAVENOUS EVERY 10 MIN PRN
Status: DISCONTINUED | OUTPATIENT
Start: 2018-03-19 | End: 2018-03-19

## 2018-03-19 RX ORDER — DEXAMETHASONE SODIUM PHOSPHATE 4 MG/ML
VIAL (ML) INJECTION AS NEEDED
Status: DISCONTINUED | OUTPATIENT
Start: 2018-03-19 | End: 2018-03-19 | Stop reason: SURG

## 2018-03-19 RX ORDER — METOPROLOL TARTRATE 5 MG/5ML
2.5 INJECTION INTRAVENOUS ONCE
Status: DISCONTINUED | OUTPATIENT
Start: 2018-03-19 | End: 2018-03-19

## 2018-03-19 RX ORDER — MORPHINE SULFATE 4 MG/ML
4 INJECTION, SOLUTION INTRAMUSCULAR; INTRAVENOUS EVERY 10 MIN PRN
Status: DISCONTINUED | OUTPATIENT
Start: 2018-03-19 | End: 2018-03-19

## 2018-03-19 RX ORDER — FAMOTIDINE 20 MG/1
20 TABLET ORAL ONCE
Status: DISCONTINUED | OUTPATIENT
Start: 2018-03-19 | End: 2018-03-19

## 2018-03-19 RX ORDER — HYDROCODONE BITARTRATE AND ACETAMINOPHEN 5; 325 MG/1; MG/1
1 TABLET ORAL AS NEEDED
Status: DISCONTINUED | OUTPATIENT
Start: 2018-03-19 | End: 2018-03-19

## 2018-03-19 RX ORDER — ACETAMINOPHEN 500 MG
1000 TABLET ORAL ONCE
Status: DISCONTINUED | OUTPATIENT
Start: 2018-03-19 | End: 2018-03-19 | Stop reason: HOSPADM

## 2018-03-19 RX ORDER — ONDANSETRON 2 MG/ML
4 INJECTION INTRAMUSCULAR; INTRAVENOUS ONCE AS NEEDED
Status: DISCONTINUED | OUTPATIENT
Start: 2018-03-19 | End: 2018-03-19

## 2018-03-19 RX ADMIN — ONDANSETRON 4 MG: 2 INJECTION INTRAMUSCULAR; INTRAVENOUS at 08:49:00

## 2018-03-19 RX ADMIN — CIPROFLOXACIN 400 MG: 2 INJECTION, SOLUTION INTRAVENOUS at 07:46:00

## 2018-03-19 RX ADMIN — SODIUM CHLORIDE, SODIUM LACTATE, POTASSIUM CHLORIDE, CALCIUM CHLORIDE: 600; 310; 30; 20 INJECTION, SOLUTION INTRAVENOUS at 09:08:00

## 2018-03-19 RX ADMIN — MIDAZOLAM HYDROCHLORIDE 2 MG: 1 INJECTION INTRAMUSCULAR; INTRAVENOUS at 07:35:00

## 2018-03-19 RX ADMIN — DEXAMETHASONE SODIUM PHOSPHATE 4 MG: 4 MG/ML VIAL (ML) INJECTION at 08:49:00

## 2018-03-19 NOTE — INTERVAL H&P NOTE
Pre-op Diagnosis: left ureteral stone    The above referenced H&P was reviewed by Ramon Shea MD on 3/19/2018, the patient was examined and no significant changes have occurred in the patient's condition since the H&P was performed.   I discussed with t

## 2018-03-19 NOTE — ANESTHESIA PREPROCEDURE EVALUATION
Anesthesia PreOp Note    HPI:     Talib Najera is a 80year old female who presents for preoperative consultation requested by: Ancelmo Delacruz MD    Date of Surgery: 3/19/2018    Procedure(s):  CYSTOSCOPY RETROGRADE  LASER HOLMIUM LITHOTRIPSY  CYSTOSCO emphysema ([facility]    • Renal lithiasis    • SVT (supraventricular tachycardia) 2005   • Type II or unspecified type diabetes mellitus without mention of complication, not stated as uncontrolled    • Unspecified essential hypertension    • Visual impairment Unknown time   Montelukast Sodium 10 MG Oral Tab Take 1 tablet (10 mg total) by mouth nightly. Disp: 30 tablet Rfl: 5 3/18/2018 at Unknown time   Levothyroxine Sodium 50 MCG Oral Tab Take 1 tablet (50 mcg total) by mouth once daily.  Disp: 90 tablet Rfl: 3 (ZOFRAN) 8 MG tablet Take 1 tablet (8 mg total) by mouth every 8 (eight) hours as needed for Nausea.  Disp: 20 tablet Rfl: 3 Unknown at Unknown time       Current Facility-Administered Medications Ordered in Epic:  0.9%  NaCl infusion  Intravenous Continuou Date    03/14/2018   K 5.4 (H) 03/14/2018    03/14/2018   CO2 21 (L) 03/14/2018   BUN 28 (H) 03/14/2018   CREATSERUM 1.67 (H) 03/14/2018    (H) 03/14/2018   PGLU 187 (H) 03/19/2018   CA 8.7 03/16/2018          Vital Signs:   Body mass ind Scalera  of the nature of the anesthetic plan, benefits, risks, major complications, and any alternative forms of anesthetic management. All of the patient's questions were answered to the best of my ability.  The patient desires the anesthetic management

## 2018-03-19 NOTE — ANESTHESIA POSTPROCEDURE EVALUATION
Patient:  Mauricio Pleitez    Procedure Summary     Date:  03/19/18 Room / Location:  [address] [place] [address] MAIN OR 14 / [address] [place] [address] MAIN OR    Anesthesia Start:  0730 Anesthesia Stop:  0908    Procedures:       CYSTOSCOPY RETROGRADE (Left Ureter)      CYSTOSCOPY STENT INSERTION (N/A

## 2018-03-19 NOTE — H&P (VIEW-ONLY)
Inpatient Consultation - KIDNEY / URETERAL STONE      Place of Service: [facility]  Reason for Consultation:  Left hydronephrosis, Left nephrolithiasis/ Kidney failure      Consult Requested by: Dr. Jordyn Goodrich     History of Present Illness:  Dileep Mayers COPD (chronic obstructive pulmonary disease) (HCC)    • Depression    • Esophageal reflux    • HTN (hypertension)    • Hyperkalemia    • Hypothyroid    • Mitral regurgitation 2007    Mild to moderate   • Other and unspecified hyperlipidemia    • Pulmonary Oral Tab EC TAKE ONE TABLET BY MOUTH TWICE DAILY WITH MEALS.  Disp: 60 tablet Rfl: 2   CIPROFLOXACIN  MG Oral Tab TAKE ONE TABLET BY MOUTH TWICE DAILY  Disp: 60 tablet Rfl: 0   SULFAMETHOXAZOLE-TMP -160 MG Oral Tab per tablet TAKE ONE TABLET BY every 8 (eight) hours as needed for Nausea. Disp: 20 tablet Rfl: 3   Blood Glucose Monitoring Suppl (ARNULFO CONTOUR NEXT EZ) W/DEVICE Does not apply Kit 1 Device by Does not apply route daily.  Disp: 1 kit Rfl: 0   Lancets (LIFESCAN UNISTIK 2) Does not apply abnormality / atraumatic  Abdomen: soft, non-tender, non-distended. No suprapubic fullness or tenderness.    Geniturinary: no CVA  Tenderness   Skin: no rashes or lesions  Musculoskeletal: moves all four extremities equally  Psychological: alert and oriente

## 2018-03-20 ENCOUNTER — LAB REQUISITION (OUTPATIENT)
Dept: LAB | Facility: HOSPITAL | Age: 83
End: 2018-03-20
Payer: MEDICARE

## 2018-03-20 DIAGNOSIS — N20.1 CALCULUS OF URETER: ICD-10-CM

## 2018-03-20 LAB
ANION GAP SERPL CALC-SCNC: 8 MMOL/L (ref 0–18)
BUN SERPL-MCNC: 24 MG/DL (ref 8–20)
BUN/CREAT SERPL: 18 (ref 10–20)
CALCIUM SERPL-MCNC: 9.2 MG/DL (ref 8.5–10.5)
CHLORIDE SERPL-SCNC: 103 MMOL/L (ref 95–110)
CO2 SERPL-SCNC: 26 MMOL/L (ref 22–32)
CREAT SERPL-MCNC: 1.33 MG/DL (ref 0.5–1.5)
GLUCOSE SERPL-MCNC: 111 MG/DL (ref 70–99)
OSMOLALITY UR CALC.SUM OF ELEC: 289 MOSM/KG (ref 275–295)
POTASSIUM SERPL-SCNC: 4.5 MMOL/L (ref 3.3–5.1)
SODIUM SERPL-SCNC: 137 MMOL/L (ref 136–144)

## 2018-03-20 PROCEDURE — 80048 BASIC METABOLIC PNL TOTAL CA: CPT | Performed by: FAMILY MEDICINE

## 2018-03-22 LAB
CALCULI MASS: 56 MG
CALCULI MASS: 59 MG
CALCULI NUMBER: 2

## 2018-03-27 ENCOUNTER — LAB REQUISITION (OUTPATIENT)
Dept: LAB | Facility: HOSPITAL | Age: 83
End: 2018-03-27
Payer: MEDICARE

## 2018-03-27 DIAGNOSIS — J43.9 PULMONARY EMPHYSEMA (HCC): ICD-10-CM

## 2018-03-27 DIAGNOSIS — I12.9 HYPERTENSIVE CHRONIC KIDNEY DISEASE WITH STAGE 1 THROUGH STAGE 4 CHRONIC KIDNEY DISEASE, OR UNSPECIFIED CHRONIC KIDNEY DISEASE: ICD-10-CM

## 2018-03-27 DIAGNOSIS — C91.10 CHRONIC LYMPHOCYTIC LEUKEMIA OF B-CELL TYPE NOT HAVING ACHIEVED REMISSION (HCC): ICD-10-CM

## 2018-03-27 LAB
ANION GAP SERPL CALC-SCNC: 6 MMOL/L (ref 0–18)
BUN SERPL-MCNC: 21 MG/DL (ref 8–20)
BUN/CREAT SERPL: 12 (ref 10–20)
CALCIUM SERPL-MCNC: 8.2 MG/DL (ref 8.5–10.5)
CHLORIDE SERPL-SCNC: 105 MMOL/L (ref 95–110)
CO2 SERPL-SCNC: 24 MMOL/L (ref 22–32)
CREAT SERPL-MCNC: 1.75 MG/DL (ref 0.5–1.5)
GLUCOSE SERPL-MCNC: 155 MG/DL (ref 70–99)
OSMOLALITY UR CALC.SUM OF ELEC: 286 MOSM/KG (ref 275–295)
POTASSIUM SERPL-SCNC: 5.3 MMOL/L (ref 3.3–5.1)
SODIUM SERPL-SCNC: 135 MMOL/L (ref 136–144)

## 2018-03-27 PROCEDURE — 80048 BASIC METABOLIC PNL TOTAL CA: CPT | Performed by: FAMILY MEDICINE

## 2018-03-31 NOTE — PROGRESS NOTES
Telephone Information:  Home Phone      [phone] Notified patient of result and DR recommendations.  Understanding verbalized  Pt denies any  K supplements or diuretics

## 2018-04-03 ENCOUNTER — LAB REQUISITION (OUTPATIENT)
Dept: LAB | Facility: HOSPITAL | Age: 83
End: 2018-04-03
Payer: MEDICARE

## 2018-04-03 DIAGNOSIS — J43.9 PULMONARY EMPHYSEMA (HCC): ICD-10-CM

## 2018-04-03 PROCEDURE — 80048 BASIC METABOLIC PNL TOTAL CA: CPT | Performed by: FAMILY MEDICINE

## 2018-04-06 NOTE — PROGRESS NOTES
Discussed these labs with patient - states Dr. Jennifer Epperson ordered the BMP  Enc to increase fluids - pt states she drinks approx 40 oz/day will try to increase  She states she has been following the hospital medication list - she will call with full list of

## 2018-04-10 ENCOUNTER — LAB REQUISITION (OUTPATIENT)
Dept: LAB | Facility: HOSPITAL | Age: 83
End: 2018-04-10
Payer: MEDICARE

## 2018-04-10 DIAGNOSIS — N20.1 CALCULUS OF URETER: ICD-10-CM

## 2018-04-10 PROCEDURE — 80048 BASIC METABOLIC PNL TOTAL CA: CPT | Performed by: FAMILY MEDICINE

## 2018-04-15 NOTE — PROGRESS NOTES
Telephone Information:  Mobile          [phone] Notified patient of result and DR recommendations. Understanding verbalized. Per pt she is getting home health. Will ask nurse to call when visit pt on Tuesday.

## 2018-04-30 PROCEDURE — 87186 SC STD MICRODIL/AGAR DIL: CPT | Performed by: INTERNAL MEDICINE

## 2018-04-30 PROCEDURE — 82652 VIT D 1 25-DIHYDROXY: CPT | Performed by: INTERNAL MEDICINE

## 2018-04-30 PROCEDURE — 87077 CULTURE AEROBIC IDENTIFY: CPT | Performed by: INTERNAL MEDICINE

## 2018-04-30 PROCEDURE — 87086 URINE CULTURE/COLONY COUNT: CPT | Performed by: INTERNAL MEDICINE

## 2018-04-30 PROCEDURE — 84156 ASSAY OF PROTEIN URINE: CPT | Performed by: INTERNAL MEDICINE

## 2018-04-30 PROCEDURE — 82610 CYSTATIN C: CPT | Performed by: INTERNAL MEDICINE

## 2018-04-30 PROCEDURE — 82570 ASSAY OF URINE CREATININE: CPT | Performed by: INTERNAL MEDICINE

## 2018-04-30 PROCEDURE — 82310 ASSAY OF CALCIUM: CPT | Performed by: INTERNAL MEDICINE

## 2018-04-30 PROCEDURE — 83970 ASSAY OF PARATHORMONE: CPT | Performed by: INTERNAL MEDICINE

## 2018-04-30 PROCEDURE — 84100 ASSAY OF PHOSPHORUS: CPT | Performed by: INTERNAL MEDICINE

## 2018-04-30 PROCEDURE — 81001 URINALYSIS AUTO W/SCOPE: CPT | Performed by: INTERNAL MEDICINE

## 2018-04-30 PROCEDURE — 82565 ASSAY OF CREATININE: CPT | Performed by: INTERNAL MEDICINE

## 2018-05-01 PROBLEM — E87.5 HYPERKALEMIA: Status: RESOLVED | Noted: 2018-02-16 | Resolved: 2018-05-01

## 2018-05-01 PROBLEM — N17.9 AKI (ACUTE KIDNEY INJURY) (HCC): Status: RESOLVED | Noted: 2018-02-16 | Resolved: 2018-05-01

## 2018-05-01 PROCEDURE — 82570 ASSAY OF URINE CREATININE: CPT | Performed by: FAMILY MEDICINE

## 2018-05-01 PROCEDURE — 82043 UR ALBUMIN QUANTITATIVE: CPT | Performed by: FAMILY MEDICINE

## 2018-05-15 PROCEDURE — 81001 URINALYSIS AUTO W/SCOPE: CPT | Performed by: INTERNAL MEDICINE

## 2018-05-15 PROCEDURE — 87086 URINE CULTURE/COLONY COUNT: CPT | Performed by: INTERNAL MEDICINE

## 2018-11-14 ENCOUNTER — LAB REQUISITION (OUTPATIENT)
Dept: LAB | Facility: HOSPITAL | Age: 83
End: 2018-11-14
Payer: MEDICARE

## 2018-11-14 DIAGNOSIS — N18.30 CHRONIC KIDNEY DISEASE, STAGE III (MODERATE) (HCC): ICD-10-CM

## 2018-11-14 PROCEDURE — 85025 COMPLETE CBC W/AUTO DIFF WBC: CPT | Performed by: INTERNAL MEDICINE

## 2018-11-14 PROCEDURE — 80048 BASIC METABOLIC PNL TOTAL CA: CPT | Performed by: INTERNAL MEDICINE

## 2019-02-20 NOTE — ED AVS SNAPSHOT
[facility] Emergency Department    [address]     [address] [place] [address]    Phone:  [phone]    Fax:  [address]   MRN: [id number]    Department:  [facility] Emergency Department   Date of Visit:  1/23/ doctor. Please ask your health care provider, pharmacist or nurse if you have any questions regarding your home medications, including potential side effects.               Medication List      CONTINUE taking these medications     ARNULFO CONTOUR NEXT EZ W/D You were examined and treated today on an urgent basis only. This was not a substitute for ongoing medical care. Often, one Emergency Department visit does not uncover every injury or illness.  If you have been referred to a primary care or a specialist ph Ricardo Bailey [address] [facility] ([address]) [facility] \[facility]\"" [phone]) [phone]   [facility] [address] [employer] ([address] [facility] [address] [facility] [address]  ([facility] Bi-Rhombic Flap Text: The defect edges were debeveled with a #15 scalpel blade.  Given the location of the defect and the proximity to free margins a bi-rhombic flap was deemed most appropriate.  Using a sterile surgical marker, an appropriate rhombic flap was drawn incorporating the defect. The area thus outlined was incised deep to adipose tissue with a #15 scalpel blade.  The skin margins were undermined to an appropriate distance in all directions utilizing iris scissors.

## 2020-01-01 ENCOUNTER — LAB REQUISITION (OUTPATIENT)
Dept: LAB | Facility: HOSPITAL | Age: 85
End: 2020-01-01
Payer: MEDICARE

## 2020-01-01 DIAGNOSIS — N18.30 CHRONIC KIDNEY DISEASE, STAGE 3 (MODERATE): ICD-10-CM

## 2020-01-01 DIAGNOSIS — J15.0: ICD-10-CM

## 2020-01-01 DIAGNOSIS — D63.1 ANEMIA IN CHRONIC KIDNEY DISEASE (CODE): ICD-10-CM

## 2020-01-01 LAB
ALBUMIN SERPL-MCNC: 2.3 G/DL (ref 3.4–5)
ALBUMIN/GLOB SERPL: 0.9 {RATIO} (ref 1–2)
ALP LIVER SERPL-CCNC: 158 U/L (ref 55–142)
ALT SERPL-CCNC: 19 U/L (ref 13–56)
ANION GAP SERPL CALC-SCNC: 8 MMOL/L (ref 0–18)
AST SERPL-CCNC: 11 U/L (ref 15–37)
BASOPHILS # BLD: 0 X10(3) UL (ref 0–0.2)
BASOPHILS NFR BLD: 0 %
BILIRUB SERPL-MCNC: 0.4 MG/DL (ref 0.1–2)
BUN BLD-MCNC: 22 MG/DL (ref 7–18)
BUN/CREAT SERPL: 12.9 (ref 10–20)
CALCIUM BLD-MCNC: 8.4 MG/DL (ref 8.5–10.1)
CHLORIDE SERPL-SCNC: 102 MMOL/L (ref 98–112)
CO2 SERPL-SCNC: 30 MMOL/L (ref 21–32)
CREAT BLD-MCNC: 1.71 MG/DL (ref 0.55–1.02)
DEPRECATED RDW RBC AUTO: 49.6 FL (ref 35.1–46.3)
EOSINOPHIL # BLD: 0 X10(3) UL (ref 0–0.7)
EOSINOPHIL NFR BLD: 0 %
ERYTHROCYTE [DISTWIDTH] IN BLOOD BY AUTOMATED COUNT: 14.1 % (ref 11–15)
GLOBULIN PLAS-MCNC: 2.5 G/DL (ref 2.8–4.4)
GLUCOSE BLD-MCNC: 89 MG/DL (ref 70–99)
HCT VFR BLD AUTO: 27.8 % (ref 35–48)
HGB BLD-MCNC: 7.4 G/DL (ref 12–16)
LYMPHOCYTES NFR BLD: 200.07 X10(3) UL (ref 1–4)
LYMPHOCYTES NFR BLD: 95 %
M PROTEIN MFR SERPL ELPH: 4.8 G/DL (ref 6.4–8.2)
MCH RBC QN AUTO: 26.6 PG (ref 26–34)
MCHC RBC AUTO-ENTMCNC: 26.6 G/DL (ref 31–37)
MCV RBC AUTO: 100 FL (ref 80–100)
MONOCYTES # BLD: 0 X10(3) UL (ref 0.1–1)
MONOCYTES NFR BLD: 0 %
NEUTROPHILS NFR BLD: 5 %
NEUTS HYPERSEG # BLD: 10.53 X10(3) UL (ref 1.5–7.7)
OSMOLALITY SERPL CALC.SUM OF ELEC: 293 MOSM/KG (ref 275–295)
PLATELET # BLD AUTO: 158 10(3)UL (ref 150–450)
PLATELET MORPHOLOGY: NORMAL
POTASSIUM SERPL-SCNC: 3.8 MMOL/L (ref 3.5–5.1)
RBC # BLD AUTO: 2.78 X10(6)UL (ref 3.8–5.3)
SODIUM SERPL-SCNC: 140 MMOL/L (ref 136–145)
TOTAL CELLS COUNTED: 100
WBC # BLD AUTO: 210.6 X10(3) UL (ref 4–11)

## 2020-01-01 PROCEDURE — 80053 COMPREHEN METABOLIC PANEL: CPT | Performed by: INTERNAL MEDICINE

## 2020-01-01 PROCEDURE — 80053 COMPREHEN METABOLIC PANEL: CPT

## 2020-01-01 PROCEDURE — 85007 BL SMEAR W/DIFF WBC COUNT: CPT

## 2020-01-01 PROCEDURE — 85027 COMPLETE CBC AUTOMATED: CPT | Performed by: INTERNAL MEDICINE

## 2020-01-01 PROCEDURE — 85060 BLOOD SMEAR INTERPRETATION: CPT | Performed by: INTERNAL MEDICINE

## 2020-01-01 PROCEDURE — 85027 COMPLETE CBC AUTOMATED: CPT

## 2020-01-01 PROCEDURE — 85007 BL SMEAR W/DIFF WBC COUNT: CPT | Performed by: INTERNAL MEDICINE

## 2021-06-12 NOTE — PLAN OF CARE
Diabetes/Glucose Control    • Glucose maintained within prescribed range Progressing        Patient/Family Goals    • Patient/Family Long Term Goal Progressing    • Patient/Family Short Term Goal Progressing        SAFETY ADULT - FALL    • Free from fall i We received a letter from [employer] insurance company stating that he was given a recalled metformin product. He should have been contacting from his pharmacy, but due to patient's poor adherence, I wonder if he has hold metformin at home that he is still using. Can someone please call him to let him know that he should contact his pharmacy and use only the new metformin he is given? Thanks!     I am also hoping to follow up with him soon.     Gabby Gonsalez, Pharm.D., [facility]  Medication Therapy Management Pharmacist  [facility]

## 2022-09-08 NOTE — PROGRESS NOTES
Telephone Information:    Mobile          [phone] Notified patient of result and DR recommendations.  Understanding verbalized  Stated she is not on any diuretics  Stated she has a kidney stone that she has procedure to remove it on 3/19/18    3/13/201 Hydroxychloroquine Counseling:  I discussed with the patient that a baseline ophthalmologic exam is needed at the start of therapy and every year thereafter while on therapy. A CBC may also be warranted for monitoring.  The side effects of this medication were discussed with the patient, including but not limited to agranulocytosis, aplastic anemia, seizures, rashes, retinopathy, and liver toxicity. Patient instructed to call the office should any adverse effect occur.  The patient verbalized understanding of the proper use and possible adverse effects of Plaquenil.  All the patient's questions and concerns were addressed.

## (undated) DEVICE — STERILE POLYISOPRENE POWDER-FREE SURGICAL GLOVES: Brand: PROTEXIS

## (undated) DEVICE — TIGERTAIL 6F FLXTIP 70CM

## (undated) DEVICE — LUBRICANT JLY SURGILUBE 2OZ

## (undated) DEVICE — NITINOL WIRE STR 035

## (undated) DEVICE — MEDI-VAC NON-CONDUCTIVE SUCTION TUBING: Brand: CARDINAL HEALTH

## (undated) DEVICE — TECH FEE LASER HOLMIUM LOW WAT

## (undated) DEVICE — SOL  .9 3000ML

## (undated) DEVICE — PSI-TEC TUBING: Brand: PSI-TEC TUBING

## (undated) DEVICE — ENDOSCOPIC VALVE WITH ADAPTER.: Brand: SURSEAL® II

## (undated) DEVICE — UROLOGY DRAIN BAG STERILE

## (undated) DEVICE — MATTRESS PT HOVERMATT 1/2L 34W

## (undated) DEVICE — STERILE LATEX POWDER-FREE SURGICAL GLOVESWITH NITRILE COATING: Brand: PROTEXIS

## (undated) DEVICE — ISOVUE 300 10X100ML VIAL

## (undated) DEVICE — UROLOGY DRAIN BAG

## (undated) DEVICE — NITINOL WIRE STR 038

## (undated) DEVICE — CYSTO PACK: Brand: MEDLINE INDUSTRIES, INC.

## (undated) DEVICE — SHEATH ACC 12FR DIL HUB FNL

## (undated) DEVICE — CATH URET CONE TIP 8FR 138008

## (undated) DEVICE — SOL H2O 1000ML BTL

## (undated) DEVICE — NITINOL STONE RETRIEVAL BASKET: Brand: ZERO TIP

## (undated) DEVICE — CONTAINER SPEC STR 4OZ GRY LID

## (undated) NOTE — IP AVS SNAPSHOT
[address]  [address] [facility] [phone]                Discharge Summary   1/9/2017    Purvi Zavala           Admission Information        Provider Department    1/9/2017 Narendra Blanc MD [facility] 5sw/S Commonly known as:  ZITHROMAX   Next dose due: Tomorrow morning 01/14/2017        Take 1 tablet (250 mg total) by mouth daily.  For 3 days    Sophy Aceves CONTOUR NEXT EZ W/DEVICE Kit   Next dose due:  Check blood suga GuaiFENesin  MG Tb12   Last time this was given:  600 mg on 1/13/2017 10:30 AM   Commonly known as:  Hunter Coates   Next dose due: Tonight 01/13/2017        Take 1 tablet (600 mg total) by mouth 2 (two) times daily.     Ketan TAKE 1 TAB IN THE MORNING   WITH BREAKFAST    Sanket Vora                           umeclidinium-vilanterol 62.5-25 MCG/INH Aepb   Last time this was given:  1 puff on 1/13/2017 10:28 AM   Commonly known as:  ANORO ELLIPTA   Next dose due:   Tomorrow m Follow up with [facility] On 1/16/2017. Why:  PNEUMONIA F/U AT 12PM    Contact information:    [address] [facility]         Follow up with Yaakov Mendieta MD In 10 days.     [facility] Manual (01/11/17)  28 (01/11/17)  70 (01/11/17)  2 (01/11/17)  0 (01/11/17)  0  (01/11/17)  6.0 (01/11/17)  14.8 (H) (01/11/17)  0.3 (01/11/17)  0.0 (01/11/17)  0.0      Metabolic Lab Results  (Last result in the past 90 days)    HgbA1C Glucose BUN Creatinine Ca If you have questions, you can call [phone] to talk to our [facility] Staff. Remember, Instilling Values is NOT to be used for urgent needs. For medical emergencies, dial 911. Visit [email or web address] to learn more.             ____________ What to report to your healthcare team: Dizziness, decreased urine amount           Cholesterol Lowering Medications     simvastatin 20 MG Oral Tab       Use: Lower cholesterol, protect your heart   Most common side effects: Dizziness, constipation, abnorm What to report to your healthcare provider: Head or mouth pain, coating on mouth/tongue, shortness of breath, sensation of heart racing, rash, or itching           Steroids     predniSONE 20 MG Oral Tab         Use:  To treat inflammation, to prevent or gdieon

## (undated) NOTE — ED AVS SNAPSHOT
[facility] Emergency Department    [address]     [address] [place] [address]    Phone:  [phone]    Fax:  [address]   MRN: [id number]    Department:  [facility] Emergency Department   Date of Visit:  1/23/ and Class Registration line at [phone] or find a doctor online by visiting [email or web address].    IF THERE IS ANY CHANGE OR WORSENING OF YOUR CONDITION, CALL YOUR PRIMARY CARE PHYSICIAN AT ONCE OR RETURN IMMEDIATELY TO [address] [place] [address].     If

## (undated) NOTE — ED AVS SNAPSHOT
Angela Proctor   MRN: [id number]    Department:  [facility] Emergency Department   Date of Visit:  10/19/2017           Disclosure     Insurance plans vary and the physician(s) referred by the ER may not be covered by your plan.  Please contact CARE PHYSICIAN AT ONCE OR RETURN IMMEDIATELY TO THE EMERGENCY DEPARTMENT. If you have been prescribed any medication(s), please fill your prescription right away and begin taking the medication(s) as directed.   If you believe that any of the medications